# Patient Record
Sex: MALE | Race: BLACK OR AFRICAN AMERICAN | NOT HISPANIC OR LATINO | URBAN - METROPOLITAN AREA
[De-identification: names, ages, dates, MRNs, and addresses within clinical notes are randomized per-mention and may not be internally consistent; named-entity substitution may affect disease eponyms.]

---

## 2022-04-27 ENCOUNTER — INPATIENT (INPATIENT)
Facility: HOSPITAL | Age: 72
LOS: 5 days | Discharge: HOME | DRG: 315 | End: 2022-05-03
Attending: THORACIC SURGERY (CARDIOTHORACIC VASCULAR SURGERY) | Admitting: THORACIC SURGERY (CARDIOTHORACIC VASCULAR SURGERY)
Payer: MEDICARE

## 2022-04-27 VITALS
SYSTOLIC BLOOD PRESSURE: 163 MMHG | HEIGHT: 62 IN | WEIGHT: 157.63 LBS | DIASTOLIC BLOOD PRESSURE: 90 MMHG | RESPIRATION RATE: 18 BRPM | OXYGEN SATURATION: 97 % | HEART RATE: 72 BPM

## 2022-04-27 DIAGNOSIS — Z95.828 PRESENCE OF OTHER VASCULAR IMPLANTS AND GRAFTS: Chronic | ICD-10-CM

## 2022-04-27 LAB
A1C WITH ESTIMATED AVERAGE GLUCOSE RESULT: 5.6 % — SIGNIFICANT CHANGE UP (ref 4–5.6)
ALBUMIN SERPL ELPH-MCNC: 3.7 G/DL — SIGNIFICANT CHANGE UP (ref 3.3–5)
ALP SERPL-CCNC: 101 U/L — SIGNIFICANT CHANGE UP (ref 40–120)
ALT FLD-CCNC: 12 U/L — SIGNIFICANT CHANGE UP (ref 10–45)
ANION GAP SERPL CALC-SCNC: 11 MMOL/L — SIGNIFICANT CHANGE UP (ref 5–17)
APPEARANCE UR: CLEAR — SIGNIFICANT CHANGE UP
APTT BLD: 34 SEC — SIGNIFICANT CHANGE UP (ref 27.5–35.5)
AST SERPL-CCNC: 13 U/L — SIGNIFICANT CHANGE UP (ref 10–40)
BACTERIA # UR AUTO: ABNORMAL /HPF
BASOPHILS # BLD AUTO: 0.02 K/UL — SIGNIFICANT CHANGE UP (ref 0–0.2)
BASOPHILS NFR BLD AUTO: 0.3 % — SIGNIFICANT CHANGE UP (ref 0–2)
BILIRUB SERPL-MCNC: 0.7 MG/DL — SIGNIFICANT CHANGE UP (ref 0.2–1.2)
BILIRUB UR-MCNC: NEGATIVE — SIGNIFICANT CHANGE UP
BLD GP AB SCN SERPL QL: NEGATIVE — SIGNIFICANT CHANGE UP
BUN SERPL-MCNC: 28 MG/DL — HIGH (ref 7–23)
CALCIUM SERPL-MCNC: 9.6 MG/DL — SIGNIFICANT CHANGE UP (ref 8.4–10.5)
CHLORIDE SERPL-SCNC: 106 MMOL/L — SIGNIFICANT CHANGE UP (ref 96–108)
CHOLEST SERPL-MCNC: 162 MG/DL — SIGNIFICANT CHANGE UP
CO2 SERPL-SCNC: 26 MMOL/L — SIGNIFICANT CHANGE UP (ref 22–31)
COLOR SPEC: YELLOW — SIGNIFICANT CHANGE UP
CREAT SERPL-MCNC: 1.25 MG/DL — SIGNIFICANT CHANGE UP (ref 0.5–1.3)
DIFF PNL FLD: ABNORMAL
EGFR: 62 ML/MIN/1.73M2 — SIGNIFICANT CHANGE UP
EOSINOPHIL # BLD AUTO: 0.38 K/UL — SIGNIFICANT CHANGE UP (ref 0–0.5)
EOSINOPHIL NFR BLD AUTO: 6.2 % — HIGH (ref 0–6)
EPI CELLS # UR: ABNORMAL /HPF (ref 0–5)
ESTIMATED AVERAGE GLUCOSE: 114 MG/DL — SIGNIFICANT CHANGE UP (ref 68–114)
GLUCOSE SERPL-MCNC: 89 MG/DL — SIGNIFICANT CHANGE UP (ref 70–99)
GLUCOSE UR QL: NEGATIVE — SIGNIFICANT CHANGE UP
HCT VFR BLD CALC: 33.4 % — LOW (ref 39–50)
HDLC SERPL-MCNC: 65 MG/DL — SIGNIFICANT CHANGE UP
HGB BLD-MCNC: 11.5 G/DL — LOW (ref 13–17)
IMM GRANULOCYTES NFR BLD AUTO: 0.3 % — SIGNIFICANT CHANGE UP (ref 0–1.5)
INR BLD: 1.18 — HIGH (ref 0.88–1.16)
KETONES UR-MCNC: NEGATIVE — SIGNIFICANT CHANGE UP
LEUKOCYTE ESTERASE UR-ACNC: ABNORMAL
LIPID PNL WITH DIRECT LDL SERPL: 74 MG/DL — SIGNIFICANT CHANGE UP
LYMPHOCYTES # BLD AUTO: 1.02 K/UL — SIGNIFICANT CHANGE UP (ref 1–3.3)
LYMPHOCYTES # BLD AUTO: 16.7 % — SIGNIFICANT CHANGE UP (ref 13–44)
MAGNESIUM SERPL-MCNC: 1.8 MG/DL — SIGNIFICANT CHANGE UP (ref 1.6–2.6)
MCHC RBC-ENTMCNC: 26.3 PG — LOW (ref 27–34)
MCHC RBC-ENTMCNC: 34.4 GM/DL — SIGNIFICANT CHANGE UP (ref 32–36)
MCV RBC AUTO: 76.3 FL — LOW (ref 80–100)
MONOCYTES # BLD AUTO: 0.7 K/UL — SIGNIFICANT CHANGE UP (ref 0–0.9)
MONOCYTES NFR BLD AUTO: 11.5 % — SIGNIFICANT CHANGE UP (ref 2–14)
NEUTROPHILS # BLD AUTO: 3.96 K/UL — SIGNIFICANT CHANGE UP (ref 1.8–7.4)
NEUTROPHILS NFR BLD AUTO: 65 % — SIGNIFICANT CHANGE UP (ref 43–77)
NITRITE UR-MCNC: POSITIVE
NON HDL CHOLESTEROL: 97 MG/DL — SIGNIFICANT CHANGE UP
NRBC # BLD: 0 /100 WBCS — SIGNIFICANT CHANGE UP (ref 0–0)
NT-PROBNP SERPL-SCNC: 170 PG/ML — SIGNIFICANT CHANGE UP (ref 0–300)
PH UR: 6 — SIGNIFICANT CHANGE UP (ref 5–8)
PLATELET # BLD AUTO: 130 K/UL — LOW (ref 150–400)
POTASSIUM SERPL-MCNC: 4.3 MMOL/L — SIGNIFICANT CHANGE UP (ref 3.5–5.3)
POTASSIUM SERPL-SCNC: 4.3 MMOL/L — SIGNIFICANT CHANGE UP (ref 3.5–5.3)
PROT SERPL-MCNC: 6.9 G/DL — SIGNIFICANT CHANGE UP (ref 6–8.3)
PROT UR-MCNC: 100 MG/DL
PROTHROM AB SERPL-ACNC: 14.1 SEC — HIGH (ref 10.5–13.4)
RBC # BLD: 4.38 M/UL — SIGNIFICANT CHANGE UP (ref 4.2–5.8)
RBC # FLD: 15.3 % — HIGH (ref 10.3–14.5)
RBC CASTS # UR COMP ASSIST: ABNORMAL /HPF
RH IG SCN BLD-IMP: POSITIVE — SIGNIFICANT CHANGE UP
RH IG SCN BLD-IMP: POSITIVE — SIGNIFICANT CHANGE UP
SARS-COV-2 RNA SPEC QL NAA+PROBE: SIGNIFICANT CHANGE UP
SODIUM SERPL-SCNC: 143 MMOL/L — SIGNIFICANT CHANGE UP (ref 135–145)
SP GR SPEC: 1.02 — SIGNIFICANT CHANGE UP (ref 1–1.03)
T4 AB SER-ACNC: 6.23 UG/DL — SIGNIFICANT CHANGE UP (ref 4.5–11.7)
TRIGL SERPL-MCNC: 116 MG/DL — SIGNIFICANT CHANGE UP
TROPONIN T SERPL-MCNC: 0.01 NG/ML — SIGNIFICANT CHANGE UP (ref 0–0.01)
TSH SERPL-MCNC: 2.04 UIU/ML — SIGNIFICANT CHANGE UP (ref 0.27–4.2)
UROBILINOGEN FLD QL: 0.2 E.U./DL — SIGNIFICANT CHANGE UP
WBC # BLD: 6.1 K/UL — SIGNIFICANT CHANGE UP (ref 3.8–10.5)
WBC # FLD AUTO: 6.1 K/UL — SIGNIFICANT CHANGE UP (ref 3.8–10.5)
WBC UR QL: ABNORMAL /HPF

## 2022-04-27 PROCEDURE — 71045 X-RAY EXAM CHEST 1 VIEW: CPT | Mod: 26

## 2022-04-27 PROCEDURE — 71275 CT ANGIOGRAPHY CHEST: CPT | Mod: 26

## 2022-04-27 PROCEDURE — 74174 CTA ABD&PLVS W/CONTRAST: CPT | Mod: 26

## 2022-04-27 RX ORDER — LABETALOL HCL 100 MG
200 TABLET ORAL EVERY 8 HOURS
Refills: 0 | Status: DISCONTINUED | OUTPATIENT
Start: 2022-04-27 | End: 2022-05-01

## 2022-04-27 RX ORDER — SODIUM CHLORIDE 9 MG/ML
3 INJECTION INTRAMUSCULAR; INTRAVENOUS; SUBCUTANEOUS EVERY 8 HOURS
Refills: 0 | Status: DISCONTINUED | OUTPATIENT
Start: 2022-04-27 | End: 2022-05-03

## 2022-04-27 RX ORDER — ALBUTEROL 90 UG/1
2 AEROSOL, METERED ORAL
Refills: 0 | Status: DISCONTINUED | OUTPATIENT
Start: 2022-04-27 | End: 2022-04-30

## 2022-04-27 RX ORDER — IPRATROPIUM/ALBUTEROL SULFATE 18-103MCG
3 AEROSOL WITH ADAPTER (GRAM) INHALATION EVERY 6 HOURS
Refills: 0 | Status: DISCONTINUED | OUTPATIENT
Start: 2022-04-27 | End: 2022-04-30

## 2022-04-27 RX ORDER — SIMVASTATIN 20 MG/1
40 TABLET, FILM COATED ORAL AT BEDTIME
Refills: 0 | Status: DISCONTINUED | OUTPATIENT
Start: 2022-04-27 | End: 2022-05-03

## 2022-04-27 RX ORDER — HEPARIN SODIUM 5000 [USP'U]/ML
5000 INJECTION INTRAVENOUS; SUBCUTANEOUS EVERY 8 HOURS
Refills: 0 | Status: DISCONTINUED | OUTPATIENT
Start: 2022-04-27 | End: 2022-05-03

## 2022-04-27 RX ORDER — AMLODIPINE BESYLATE 2.5 MG/1
10 TABLET ORAL DAILY
Refills: 0 | Status: DISCONTINUED | OUTPATIENT
Start: 2022-04-27 | End: 2022-05-01

## 2022-04-27 RX ORDER — FAMOTIDINE 10 MG/ML
20 INJECTION INTRAVENOUS
Refills: 0 | Status: DISCONTINUED | OUTPATIENT
Start: 2022-04-27 | End: 2022-05-03

## 2022-04-27 RX ADMIN — SODIUM CHLORIDE 3 MILLILITER(S): 9 INJECTION INTRAMUSCULAR; INTRAVENOUS; SUBCUTANEOUS at 21:05

## 2022-04-27 RX ADMIN — Medication 200 MILLIGRAM(S): at 06:20

## 2022-04-27 RX ADMIN — SODIUM CHLORIDE 3 MILLILITER(S): 9 INJECTION INTRAMUSCULAR; INTRAVENOUS; SUBCUTANEOUS at 13:26

## 2022-04-27 RX ADMIN — AMLODIPINE BESYLATE 10 MILLIGRAM(S): 2.5 TABLET ORAL at 06:19

## 2022-04-27 RX ADMIN — FAMOTIDINE 20 MILLIGRAM(S): 10 INJECTION INTRAVENOUS at 17:46

## 2022-04-27 RX ADMIN — Medication 200 MILLIGRAM(S): at 13:41

## 2022-04-27 RX ADMIN — Medication 1 TABLET(S): at 17:46

## 2022-04-27 RX ADMIN — SIMVASTATIN 40 MILLIGRAM(S): 20 TABLET, FILM COATED ORAL at 21:11

## 2022-04-27 RX ADMIN — HEPARIN SODIUM 5000 UNIT(S): 5000 INJECTION INTRAVENOUS; SUBCUTANEOUS at 21:11

## 2022-04-27 RX ADMIN — Medication 200 MILLIGRAM(S): at 21:11

## 2022-04-27 RX ADMIN — HEPARIN SODIUM 5000 UNIT(S): 5000 INJECTION INTRAVENOUS; SUBCUTANEOUS at 13:41

## 2022-04-27 RX ADMIN — Medication 1 TABLET(S): at 06:19

## 2022-04-27 RX ADMIN — Medication 3 MILLILITER(S): at 18:03

## 2022-04-27 NOTE — H&P ADULT - ASSESSMENT
71 y/o male poor historian lies in a nursing home PMH type B aortic dissection 2015, s/p TEVAR , HTN, asthma, BPH who presented to [facility] with an epigastric hematoma. Denies pain or traumatic fall, not on anticoagulants. No other significant symptoms. Subsequent CTA chest showed minimal extravasation of contrast/endovascular leak, normal post-operative changes suggestive of reimplantation of great vessels            plan: observation/BP control/no surgical intervention most likely normal post op changes seen on CTA chest/small endoleak

## 2022-04-27 NOTE — H&P ADULT - NSHPREVIEWOFSYSTEMS_GEN_ALL_CORE
Review of Systems  CONSTITUTIONAL:  Denies Fevers / chills, sweats, fatigue, weight loss, weight gain                                      NEURO:  Denies paresthesias, seizures, syncope, confusion                                                                                EYES:  Denies Blurry vision, discharge, pain, loss of vision                                                                                    ENMT:  Denies Difficulty hearing, vertigo, dysphagia, epistaxis, recent dental work                                       CV:  Denies Chest pain, palpitations, BUENROSTRO, orthopnea                                                                                          RESPIRATORY:    Wheezing,                                                                 GI:  Denies Nausea, vomiting, diarrhea, constipation, melena, difficulty swallowing                                               : Denies Hematuria, dysuria, urgency, incontinence                                                                                         MUSCULOSKELETAL:  Denies arthritis, joint swelling, muscle weakness                                                             SKIN/BREAST:  Denies rash, itching, hair loss, masses                                                                                            PSYCH:  Denies depression, anxiety, suicidal ideation                                                                                               HEME/LYMPH:  Denies bruises easily, enlarged lymph nodes, tender lymph nodes                                        ENDOCRINE:  Denies cold intolerance, heat intolerance, polydipsia

## 2022-04-27 NOTE — H&P ADULT - HISTORY OF PRESENT ILLNESS
71 y/o male poor historian lies in a nursing home PMH type B aortic dissection 2015, s/p TEVAR , HTN, asthma, BPH who presented to [facility] with an epigastric hematoma. Denies pain or traumatic fall, not on anticoagulants. No other significant symptoms. Subsequent CTA chest showed minimal extravasation of contrast/endovascular leak, normal post-operative changes suggestive of reimplantation of great vessels. Transferred to [facility] under Dr. Marie for evaluation and management.

## 2022-04-27 NOTE — H&P ADULT - NSCORESITESY/N_GEN_A_CORE_RD
Problem: Respiratory Impairment - Respiratory Therapy 253  Intervention: Inhaled medication delivery  Intervention Status  Done         No

## 2022-04-27 NOTE — PATIENT PROFILE ADULT - FALL HARM RISK - RISK INTERVENTIONS
Leon Lazaro
Assistance OOB with selected safe patient handling equipment/Assistance with ambulation/Communicate Fall Risk and Risk Factors to all staff, patient, and family/Reinforce activity limits and safety measures with patient and family/Visual Cue: Yellow wristband/Bed in lowest position, wheels locked, appropriate side rails in place/Call bell, personal items and telephone in reach/Instruct patient to call for assistance before getting out of bed or chair/Non-slip footwear when patient is out of bed/[place] to call system/Physically safe environment - no spills, clutter or unnecessary equipment/Purposeful Proactive Rounding/Room/bathroom lighting operational, light cord in reach

## 2022-04-27 NOTE — PROGRESS NOTE ADULT - SUBJECTIVE AND OBJECTIVE BOX
Patient discussed on morning rounds with Dr. Marie    Operation / Date:  Type B aortic dissection repair with Dr. Marie    SUBJECTIVE ASSESSMENT:  Patient feels well today.  He denies any recent fall, car accident, trauma, fights, etc.  He recently developed left lower chest/upper abdomen ecchymosis and small round "area of hardening" in the area under the skin.  Nothing preceded this.  He went to an OSH and CTA done revealing ?small endoleak along with post-surgical changes but nothing significant to explain this bruising.  No broken ribs or other signs of trauma.        Vital Signs Last 24 Hrs  T(C): 36.6 (2022 09:15), Max: 36.6 (2022 09:15)  T(F): 97.8 (2022 09:15), Max: 97.8 (2022 09:15)  HR: 74 (2022 08:14) (70 - 74)  BP: 134/75 (2022 08:14) (134/62 - 163/90)  BP(mean): 100 (2022 08:14) (92 - 120)  RR: 16 (2022 05:30) (16 - 18)  SpO2: 96% (2022 08:14) (96% - 98%)  I&O's Detail    2022 07:01  -  2022 07:00  --------------------------------------------------------  IN:  Total IN: 0 mL    OUT:    Voided (mL): 500 mL  Total OUT: 500 mL    Total NET: -500 mL      2022 07:01  -  2022 11:03  --------------------------------------------------------  IN:    Oral Fluid: 275 mL  Total IN: 275 mL    OUT:  Total OUT: 0 mL    Total NET: 275 mL          PHYSICAL EXAM:    GEN: NAD, looks comfortable; Non-toxic appearing.   Psych: Mood appropriate  Neuro: A&Ox3.  No focal deficits.  Moving all extremities.   HEENT: No obvious abnormalities  CV: S1S2, regular, no murmurs appreciated.  No carotid bruits.  No JVD  Lungs: Clear B/L.  No wheezing, rales or rhonchi  ABD: Soft, non-tender, non-distended.  +Bowel sounds  EXT: Warm and well perfused.  No peripheral edema noted  Musculoskeletal: Moving all extremities with normal ROM, no joint swelling  PV: Pedal pulses palpable  Skin: Area under left rib cage, anteriorly on chest with horizontal oval shaped ecchymosis about [id number], along with a round/walnut size hard ?hematoma felt under the skin.  It is not moveable.  Painful to touch.  Not warm to touch.      LABS:                        11.5   6.10  )-----------( 130      ( 2022 02:20 )             33.4       COUMADIN:  Yes/No. REASON: .    PT/INR - ( 2022 02:20 )   PT: 14.1 sec;   INR: 1.18          PTT - ( 2022 02:20 )  PTT:34.0 sec        143  |  106  |  28<H>  ----------------------------<  89  4.3   |  26  |  1.25    Ca    9.6      2022 02:20  Mg     1.8         TPro  6.9  /  Alb  3.7  /  TBili  0.7  /  DBili  x   /  AST  13  /  ALT  12  /  AlkPhos  101        Urinalysis Basic - ( 2022 02:20 )    Color: Yellow / Appearance: Clear / S.020 / pH: x  Gluc: x / Ketone: NEGATIVE  / Bili: Negative / Urobili: 0.2 E.U./dL   Blood: x / Protein: 100 mg/dL / Nitrite: POSITIVE   Leuk Esterase: Trace / RBC: Many /HPF / WBC Many /HPF   Sq Epi: x / Non Sq Epi: 5-10 /HPF / Bacteria: Many /HPF        MEDICATIONS  (STANDING):  amLODIPine   Tablet 10 milliGRAM(s) Oral daily  famotidine    Tablet 20 milliGRAM(s) Oral two times a day  heparin   Injectable 5000 Unit(s) SubCutaneous every 8 hours  labetalol 200 milliGRAM(s) Oral every 8 hours  simvastatin 40 milliGRAM(s) Oral at bedtime  sodium chloride 0.9% lock flush 3 milliLiter(s) IV Push every 8 hours  trimethoprim  160 mG/sulfamethoxazole 800 mG 1 Tablet(s) Oral two times a day    MEDICATIONS  (PRN):        RADIOLOGY & ADDITIONAL TESTS:

## 2022-04-27 NOTE — PROGRESS NOTE ADULT - ASSESSMENT
71 y/o male poor historian who lives in a nursing home with PMHx type B aortic dissection 2015, s/p TEVAR , HTN, asthma, BPH who presented to [facility] with new onset bruising left upper epigastric area, just under rib cage associated w/ pain to touch.  Came on about 5 days ago.  Denies traumatic fall or other accident/injuries. Not on anticoagulants, just ASA.  No other significant symptoms.  Subsequent CTA chest showed minimal extravasation of contrast/endovascular leak, normal post-operative changes suggestive of reimplantation of great vessels.      Problem 1.  ?New hematoma under skin.  ?Related to previous aortic surgery or not.  Dr. Marie reviewed paper report from OSH.  Non-toxic appearing.  WBC normal, afebrile.  Plan today per Dr. Marie- Repeat CT scan, 3-phase C/A/P.      Problem 2.  HTN.  Labetolol and Norvasc.      Problem 3.  UTI, +UA.  UCx with next void.      Problem 4.  HLD. Statin        Tele  Full CODE

## 2022-04-27 NOTE — H&P ADULT - NSICDXPASTMEDICALHX_GEN_ALL_CORE_FT
PAST MEDICAL HISTORY:  BPH (benign prostatic hyperplasia)     H/O aortic dissection     HTN (hypertension)

## 2022-04-27 NOTE — H&P ADULT - NSHPPHYSICALEXAM_GEN_ALL_CORE
Physical Exam  CONSTITUTIONAL:                                                              poor historian/cognitive dysfunction  NEURO:                                                                       WNL                      EYES:                                                                                WNL  ENMT:                                                                               WNL  CV:                                                                                   NSR  RESPIRATORY:                                                                 mild expiratory wheezes  GI:                                                                                     WNL  : GUAJARDO + / -                                                                  WNL  MUSCULOSKELETAL:                                                       WNL  SKIN / BREAST:                                                                  small hematoma left side of stomach 10cm x 10cm      ICU Vital Signs Last 24 Hrs  T(C): 36.3 (27 Apr 2022 01:00), Max: 36.3 (27 Apr 2022 01:00)  T(F): 97.4 (27 Apr 2022 01:00), Max: 97.4 (27 Apr 2022 01:00)  HR: 70 (27 Apr 2022 01:16) (70 - 72)  BP: 156/78 (27 Apr 2022 01:16) (156/78 - 163/90)  BP(mean): 108 (27 Apr 2022 01:16) (108 - 120)  ABP: --  ABP(mean): --  RR: 16 (27 Apr 2022 01:16) (16 - 18)  SpO2: 97% (27 Apr 2022 01:16) (97% - 97%)

## 2022-04-28 LAB
ANION GAP SERPL CALC-SCNC: 10 MMOL/L — SIGNIFICANT CHANGE UP (ref 5–17)
ANION GAP SERPL CALC-SCNC: 12 MMOL/L — SIGNIFICANT CHANGE UP (ref 5–17)
ANION GAP SERPL CALC-SCNC: 13 MMOL/L — SIGNIFICANT CHANGE UP (ref 5–17)
BUN SERPL-MCNC: 33 MG/DL — HIGH (ref 7–23)
BUN SERPL-MCNC: 34 MG/DL — HIGH (ref 7–23)
BUN SERPL-MCNC: 37 MG/DL — HIGH (ref 7–23)
CALCIUM SERPL-MCNC: 8.8 MG/DL — SIGNIFICANT CHANGE UP (ref 8.4–10.5)
CALCIUM SERPL-MCNC: 8.8 MG/DL — SIGNIFICANT CHANGE UP (ref 8.4–10.5)
CALCIUM SERPL-MCNC: 9.2 MG/DL — SIGNIFICANT CHANGE UP (ref 8.4–10.5)
CHLORIDE SERPL-SCNC: 102 MMOL/L — SIGNIFICANT CHANGE UP (ref 96–108)
CHLORIDE SERPL-SCNC: 102 MMOL/L — SIGNIFICANT CHANGE UP (ref 96–108)
CHLORIDE SERPL-SCNC: 103 MMOL/L — SIGNIFICANT CHANGE UP (ref 96–108)
CO2 SERPL-SCNC: 24 MMOL/L — SIGNIFICANT CHANGE UP (ref 22–31)
CO2 SERPL-SCNC: 25 MMOL/L — SIGNIFICANT CHANGE UP (ref 22–31)
CO2 SERPL-SCNC: 26 MMOL/L — SIGNIFICANT CHANGE UP (ref 22–31)
CREAT SERPL-MCNC: 1.97 MG/DL — HIGH (ref 0.5–1.3)
CREAT SERPL-MCNC: 2.2 MG/DL — HIGH (ref 0.5–1.3)
CREAT SERPL-MCNC: 2.47 MG/DL — HIGH (ref 0.5–1.3)
EGFR: 27 ML/MIN/1.73M2 — LOW
EGFR: 31 ML/MIN/1.73M2 — LOW
EGFR: 36 ML/MIN/1.73M2 — LOW
GLUCOSE SERPL-MCNC: 125 MG/DL — HIGH (ref 70–99)
GLUCOSE SERPL-MCNC: 133 MG/DL — HIGH (ref 70–99)
GLUCOSE SERPL-MCNC: 95 MG/DL — SIGNIFICANT CHANGE UP (ref 70–99)
HCT VFR BLD CALC: 32.9 % — LOW (ref 39–50)
HCV AB S/CO SERPL IA: 0.04 S/CO — SIGNIFICANT CHANGE UP
HCV AB SERPL-IMP: SIGNIFICANT CHANGE UP
HGB BLD-MCNC: 11.1 G/DL — LOW (ref 13–17)
MAGNESIUM SERPL-MCNC: 2 MG/DL — SIGNIFICANT CHANGE UP (ref 1.6–2.6)
MCHC RBC-ENTMCNC: 26.3 PG — LOW (ref 27–34)
MCHC RBC-ENTMCNC: 33.7 GM/DL — SIGNIFICANT CHANGE UP (ref 32–36)
MCV RBC AUTO: 78 FL — LOW (ref 80–100)
NRBC # BLD: 0 /100 WBCS — SIGNIFICANT CHANGE UP (ref 0–0)
PLATELET # BLD AUTO: 134 K/UL — LOW (ref 150–400)
POTASSIUM SERPL-MCNC: 4.3 MMOL/L — SIGNIFICANT CHANGE UP (ref 3.5–5.3)
POTASSIUM SERPL-MCNC: 4.7 MMOL/L — SIGNIFICANT CHANGE UP (ref 3.5–5.3)
POTASSIUM SERPL-MCNC: 4.8 MMOL/L — SIGNIFICANT CHANGE UP (ref 3.5–5.3)
POTASSIUM SERPL-SCNC: 4.3 MMOL/L — SIGNIFICANT CHANGE UP (ref 3.5–5.3)
POTASSIUM SERPL-SCNC: 4.7 MMOL/L — SIGNIFICANT CHANGE UP (ref 3.5–5.3)
POTASSIUM SERPL-SCNC: 4.8 MMOL/L — SIGNIFICANT CHANGE UP (ref 3.5–5.3)
RBC # BLD: 4.22 M/UL — SIGNIFICANT CHANGE UP (ref 4.2–5.8)
RBC # FLD: 15.4 % — HIGH (ref 10.3–14.5)
SODIUM SERPL-SCNC: 139 MMOL/L — SIGNIFICANT CHANGE UP (ref 135–145)
WBC # BLD: 4.94 K/UL — SIGNIFICANT CHANGE UP (ref 3.8–10.5)
WBC # FLD AUTO: 4.94 K/UL — SIGNIFICANT CHANGE UP (ref 3.8–10.5)

## 2022-04-28 PROCEDURE — 99231 SBSQ HOSP IP/OBS SF/LOW 25: CPT

## 2022-04-28 RX ORDER — SODIUM CHLORIDE 9 MG/ML
1000 INJECTION INTRAMUSCULAR; INTRAVENOUS; SUBCUTANEOUS ONCE
Refills: 0 | Status: COMPLETED | OUTPATIENT
Start: 2022-04-28 | End: 2022-04-28

## 2022-04-28 RX ORDER — CIPROFLOXACIN LACTATE 400MG/40ML
400 VIAL (ML) INTRAVENOUS EVERY 12 HOURS
Refills: 0 | Status: DISCONTINUED | OUTPATIENT
Start: 2022-04-28 | End: 2022-04-28

## 2022-04-28 RX ORDER — SODIUM CHLORIDE 9 MG/ML
1000 INJECTION INTRAMUSCULAR; INTRAVENOUS; SUBCUTANEOUS
Refills: 0 | Status: DISCONTINUED | OUTPATIENT
Start: 2022-04-28 | End: 2022-05-01

## 2022-04-28 RX ORDER — CIPROFLOXACIN LACTATE 400MG/40ML
400 VIAL (ML) INTRAVENOUS ONCE
Refills: 0 | Status: COMPLETED | OUTPATIENT
Start: 2022-04-28 | End: 2022-04-28

## 2022-04-28 RX ORDER — CEFTRIAXONE 500 MG/1
1000 INJECTION, POWDER, FOR SOLUTION INTRAMUSCULAR; INTRAVENOUS EVERY 24 HOURS
Refills: 0 | Status: DISCONTINUED | OUTPATIENT
Start: 2022-04-29 | End: 2022-04-30

## 2022-04-28 RX ORDER — SODIUM CHLORIDE 9 MG/ML
1000 INJECTION INTRAMUSCULAR; INTRAVENOUS; SUBCUTANEOUS ONCE
Refills: 0 | Status: DISCONTINUED | OUTPATIENT
Start: 2022-04-28 | End: 2022-04-28

## 2022-04-28 RX ORDER — CIPROFLOXACIN LACTATE 400MG/40ML
VIAL (ML) INTRAVENOUS
Refills: 0 | Status: DISCONTINUED | OUTPATIENT
Start: 2022-04-28 | End: 2022-04-28

## 2022-04-28 RX ADMIN — SIMVASTATIN 40 MILLIGRAM(S): 20 TABLET, FILM COATED ORAL at 21:19

## 2022-04-28 RX ADMIN — HEPARIN SODIUM 5000 UNIT(S): 5000 INJECTION INTRAVENOUS; SUBCUTANEOUS at 06:14

## 2022-04-28 RX ADMIN — Medication 200 MILLIGRAM(S): at 06:15

## 2022-04-28 RX ADMIN — FAMOTIDINE 20 MILLIGRAM(S): 10 INJECTION INTRAVENOUS at 06:14

## 2022-04-28 RX ADMIN — SODIUM CHLORIDE 3 MILLILITER(S): 9 INJECTION INTRAMUSCULAR; INTRAVENOUS; SUBCUTANEOUS at 06:14

## 2022-04-28 RX ADMIN — SODIUM CHLORIDE 3 MILLILITER(S): 9 INJECTION INTRAMUSCULAR; INTRAVENOUS; SUBCUTANEOUS at 13:01

## 2022-04-28 RX ADMIN — Medication 200 MILLIGRAM(S): at 21:19

## 2022-04-28 RX ADMIN — SODIUM CHLORIDE 3 MILLILITER(S): 9 INJECTION INTRAMUSCULAR; INTRAVENOUS; SUBCUTANEOUS at 21:15

## 2022-04-28 RX ADMIN — Medication 200 MILLIGRAM(S): at 11:22

## 2022-04-28 RX ADMIN — AMLODIPINE BESYLATE 10 MILLIGRAM(S): 2.5 TABLET ORAL at 06:14

## 2022-04-28 RX ADMIN — Medication 200 MILLIGRAM(S): at 13:19

## 2022-04-28 RX ADMIN — HEPARIN SODIUM 5000 UNIT(S): 5000 INJECTION INTRAVENOUS; SUBCUTANEOUS at 21:20

## 2022-04-28 RX ADMIN — Medication 3 MILLILITER(S): at 20:41

## 2022-04-28 RX ADMIN — SODIUM CHLORIDE 50 MILLILITER(S): 9 INJECTION INTRAMUSCULAR; INTRAVENOUS; SUBCUTANEOUS at 13:16

## 2022-04-28 RX ADMIN — SODIUM CHLORIDE 1000 MILLILITER(S): 9 INJECTION INTRAMUSCULAR; INTRAVENOUS; SUBCUTANEOUS at 09:00

## 2022-04-28 RX ADMIN — Medication 1 TABLET(S): at 06:16

## 2022-04-28 RX ADMIN — HEPARIN SODIUM 5000 UNIT(S): 5000 INJECTION INTRAVENOUS; SUBCUTANEOUS at 13:16

## 2022-04-28 RX ADMIN — FAMOTIDINE 20 MILLIGRAM(S): 10 INJECTION INTRAVENOUS at 16:49

## 2022-04-28 NOTE — PROGRESS NOTE ADULT - SUBJECTIVE AND OBJECTIVE BOX
Patient discussed on morning rounds with Dr. Marie    Operation / Date:  Type B aortic dissection repair with Dr. Marie      SUBJECTIVE ASSESSMENT:  72y Male seen and examined at bedside.  Patient with no complaints.  Patient denies chest pain, shortness of breath, nausea, vomiting.        Vital Signs Last 24 Hrs  T(C): 36.6 (2022 06:10), Max: 36.9 (2022 21:33)  T(F): 97.8 (2022 06:10), Max: 98.5 (2022 21:33)  HR: 72 (2022 09:37) (71 - 83)  BP: 116/56 (2022 09:37) (116/56 - 150/71)  BP(mean): 80 (2022 09:37) (80 - 101)  RR: 17 (2022 09:37) (12 - 18)  SpO2: 95% (2022 09:37) (94% - 99%)  I&O's Detail    2022 07:01  -  2022 07:00  --------------------------------------------------------  IN:    Oral Fluid: 750 mL  Total IN: 750 mL    OUT:    Voided (mL): 800 mL  Total OUT: 800 mL    Total NET: -50 mL          CHEST TUBE:  No.   MATTHEW DRAIN:  No.  EPICARDIAL WIRES: No.  TIE DOWNS: No.  GUAJARDO: No.    PHYSICAL EXAM:    GEN: NAD, looks comfortable; Non-toxic appearing.   Psych: Mood appropriate  Neuro: A&Ox3.  No focal deficits.  Moving all extremities.   HEENT: No obvious abnormalities  CV: S1S2, regular, no murmurs appreciated.  No carotid bruits.  No JVD  Lungs: Clear B/L.  No wheezing, rales or rhonchi  ABD: Soft, non-tender, non-distended.  +Bowel sounds  EXT: Warm and well perfused.  No peripheral edema noted  Musculoskeletal: Moving all extremities with normal ROM, no joint swelling  PV: Pedal pulses palpable  Skin: Area under left rib cage, anteriorly on chest with horizontal oval shaped ecchymosis about [id number], along with a round/walnut size hard ?hematoma felt under the skin.  It is not moveable.  Painful to touch.  Not warm to touch. Appears to be stable    LABS:                        11.1   4.94  )-----------( 134      ( 2022 05:49 )             32.9       COUMADIN:  Yes/No. REASON: .    PT/INR - ( 2022 02:20 )   PT: 14.1 sec;   INR: 1.18          PTT - ( 2022 02:20 )  PTT:34.0 sec        139  |  102  |  34<H>  ----------------------------<  95  4.3   |  25  |  1.97<H>    Ca    9.2      2022 05:49  Mg     2.0         TPro  6.9  /  Alb  3.7  /  TBili  0.7  /  DBili  x   /  AST  13  /  ALT  12  /  AlkPhos  101        Urinalysis Basic - ( 2022 02:20 )    Color: Yellow / Appearance: Clear / S.020 / pH: x  Gluc: x / Ketone: NEGATIVE  / Bili: Negative / Urobili: 0.2 E.U./dL   Blood: x / Protein: 100 mg/dL / Nitrite: POSITIVE   Leuk Esterase: Trace / RBC: Many /HPF / WBC Many /HPF   Sq Epi: x / Non Sq Epi: 5-10 /HPF / Bacteria: Many /HPF        MEDICATIONS  (STANDING):  amLODIPine   Tablet 10 milliGRAM(s) Oral daily  famotidine    Tablet 20 milliGRAM(s) Oral two times a day  heparin   Injectable 5000 Unit(s) SubCutaneous every 8 hours  labetalol 200 milliGRAM(s) Oral every 8 hours  simvastatin 40 milliGRAM(s) Oral at bedtime  sodium chloride 0.9% lock flush 3 milliLiter(s) IV Push every 8 hours    MEDICATIONS  (PRN):  ALBUTerol    90 MICROgram(s) HFA Inhaler 2 Puff(s) Inhalation two times a day PRN Shortness of Breath and/or Wheezing  albuterol/ipratropium for Nebulization 3 milliLiter(s) Nebulizer every 6 hours PRN Shortness of Breath and/or Wheezing        RADIOLOGY & ADDITIONAL TESTS:

## 2022-04-28 NOTE — PROGRESS NOTE ADULT - ASSESSMENT
71 y/o male poor historian who lives in a nursing home with PMHx type B aortic dissection 2015, s/p TEVAR , HTN, asthma, BPH who presented to [facility] with new onset bruising left upper epigastric area, just under rib cage associated w/ pain to touch.  Came on about 5 days ago.  Denies traumatic fall or other accident/injuries. Not on anticoagulants, just ASA.  No other significant symptoms.  Subsequent CTA chest showed minimal extravasation of contrast/endovascular leak, normal post-operative changes suggestive of reimplantation of great vessels.      ==== Neurovascular ====  -No delirium, pain well managed on current regimen  -C/w PRNs for Pain control  -Monitor neuro status    ==== Respiratory ====  -Saturates well on RA     -AM CXR stable, repeat in AM  -Encourage IS 10x/hour while awake, Cough and deep breathing exercises  -Monitor respiratory status via SpO2    ==== Cardiovascular ====  Monitor on Tele  Continue aspirin 81mg QD  Continue Statin  Continue Labetalol 200mg QD  Continue amlodipine 10mg QD    ==== GI ====   -Tolerating PO  -Prophylaxis: Protonix  -C/w bowel regimen    ==== /Renal ====  -BUN/Cr: 34/1.97, hydrate with IVF, stop bactrim, repeat at 12pm  -Trend Cr on AM labs  -Replete electrolytes as needed      ==== ID ====   Afebrile, asymptomatic  -WBC: 4.94  -Continue to monitor for SIRS/Sepsis syndrome while inpatient  -UA positive, started on bactrim, got YANIRA, bactrim stopped, will start ciprol 500mg Q8hrs for 5 days    ==== Endocrine ====   -A1C: no h/o DM  -TSH: no h/o thyroid disease     ==== Hematologic ====   -H/H: 11.1/32.9  -CBC, chem in AM  -DVT ppx: HSQ 5000u q8h and SCDs    ==== Disposition Planning ====  Telemetry  Problem 1.  ?New hematoma under skin.  ?Related to previous aortic surgery or not.  Dr. Marie reviewed paper report from OSH.  Non-toxic appearing.  WBC normal, afebrile.  Plan today per Dr. Marie- Repeat CT scan, 3-phase C/A/P.      Problem 2.  HTN.  Labetolol and Norvasc.      Problem 3.  UTI, +UA.  UCx with next void.      Problem 4.  HLD. Statin        Tele  Full CODE   73 y/o male poor historian who lives in a nursing home with PMHx type B aortic dissection 2015, s/p TEVAR , HTN, asthma, BPH who presented to [facility] with new onset bruising left upper epigastric area, just under rib cage associated w/ pain to touch.  Came on about 5 days ago.  Denies traumatic fall or other accident/injuries. Not on anticoagulants, just ASA.  No other significant symptoms.  Subsequent CTA chest showed minimal extravasation of contrast/endovascular leak, normal post-operative changes suggestive of reimplantation of great vessels.      ==== Neurovascular ====  -No delirium, pain well managed on current regimen  -C/w PRNs for Pain control  -Monitor neuro status    ==== Respiratory ====  -Saturates well on RA     -AM CXR stable, repeat in AM  -Encourage IS 10x/hour while awake, Cough and deep breathing exercises  -Monitor respiratory status via SpO2    ==== Cardiovascular ====  Monitor on Tele  Continue aspirin 81mg QD  Continue Statin  Continue Labetalol 200mg QD  Continue amlodipine 10mg QD    ==== GI ====   -Tolerating PO  -Prophylaxis: Protonix  -C/w bowel regimen    ==== /Renal ====  -BUN/Cr: 34/1.97, hydrate with IVF, stop bactrim, repeat at 12pm  -Trend Cr on AM labs  -Replete electrolytes as needed      ==== ID ====   Afebrile, asymptomatic  -WBC: 4.94  -Continue to monitor for SIRS/Sepsis syndrome while inpatient  -UA positive, started on bactrim, got YANIRA, bactrim stopped, will start ciprol 500mg Q12hrs for 5 days    ==== Endocrine ====   -A1C: no h/o DM  -TSH: no h/o thyroid disease     ==== Hematologic ====   -H/H: 11.1/32.9  -CBC, chem in AM  -DVT ppx: HSQ 5000u q8h and SCDs    ==== Disposition Planning ====  Telemetry

## 2022-04-29 PROBLEM — N40.0 BENIGN PROSTATIC HYPERPLASIA WITHOUT LOWER URINARY TRACT SYMPTOMS: Chronic | Status: ACTIVE | Noted: 2022-04-27

## 2022-04-29 PROBLEM — I10 ESSENTIAL (PRIMARY) HYPERTENSION: Chronic | Status: ACTIVE | Noted: 2022-04-27

## 2022-04-29 PROBLEM — Z86.79 PERSONAL HISTORY OF OTHER DISEASES OF THE CIRCULATORY SYSTEM: Chronic | Status: ACTIVE | Noted: 2022-04-27

## 2022-04-29 LAB
-  AMPICILLIN/SULBACTAM: SIGNIFICANT CHANGE UP
-  AMPICILLIN: SIGNIFICANT CHANGE UP
-  CEFAZOLIN: SIGNIFICANT CHANGE UP
-  CEFTRIAXONE: SIGNIFICANT CHANGE UP
-  CIPROFLOXACIN: SIGNIFICANT CHANGE UP
-  ERTAPENEM: SIGNIFICANT CHANGE UP
-  GENTAMICIN: SIGNIFICANT CHANGE UP
-  NITROFURANTOIN: SIGNIFICANT CHANGE UP
-  PIPERACILLIN/TAZOBACTAM: SIGNIFICANT CHANGE UP
-  TOBRAMYCIN: SIGNIFICANT CHANGE UP
-  TRIMETHOPRIM/SULFAMETHOXAZOLE: SIGNIFICANT CHANGE UP
ANION GAP SERPL CALC-SCNC: 10 MMOL/L — SIGNIFICANT CHANGE UP (ref 5–17)
ANION GAP SERPL CALC-SCNC: 14 MMOL/L — SIGNIFICANT CHANGE UP (ref 5–17)
BUN SERPL-MCNC: 39 MG/DL — HIGH (ref 7–23)
BUN SERPL-MCNC: 43 MG/DL — HIGH (ref 7–23)
CALCIUM SERPL-MCNC: 8.7 MG/DL — SIGNIFICANT CHANGE UP (ref 8.4–10.5)
CALCIUM SERPL-MCNC: 8.8 MG/DL — SIGNIFICANT CHANGE UP (ref 8.4–10.5)
CHLORIDE SERPL-SCNC: 107 MMOL/L — SIGNIFICANT CHANGE UP (ref 96–108)
CHLORIDE SERPL-SCNC: 108 MMOL/L — SIGNIFICANT CHANGE UP (ref 96–108)
CO2 SERPL-SCNC: 21 MMOL/L — LOW (ref 22–31)
CO2 SERPL-SCNC: 22 MMOL/L — SIGNIFICANT CHANGE UP (ref 22–31)
CREAT SERPL-MCNC: 2.25 MG/DL — HIGH (ref 0.5–1.3)
CREAT SERPL-MCNC: 2.36 MG/DL — HIGH (ref 0.5–1.3)
CULTURE RESULTS: SIGNIFICANT CHANGE UP
EGFR: 29 ML/MIN/1.73M2 — LOW
EGFR: 30 ML/MIN/1.73M2 — LOW
GLUCOSE SERPL-MCNC: 167 MG/DL — HIGH (ref 70–99)
GLUCOSE SERPL-MCNC: 96 MG/DL — SIGNIFICANT CHANGE UP (ref 70–99)
HCT VFR BLD CALC: 27.5 % — LOW (ref 39–50)
HCT VFR BLD CALC: 30.5 % — LOW (ref 39–50)
HGB BLD-MCNC: 10.3 G/DL — LOW (ref 13–17)
HGB BLD-MCNC: 9.4 G/DL — LOW (ref 13–17)
MAGNESIUM SERPL-MCNC: 2 MG/DL — SIGNIFICANT CHANGE UP (ref 1.6–2.6)
MCHC RBC-ENTMCNC: 26.6 PG — LOW (ref 27–34)
MCHC RBC-ENTMCNC: 26.8 PG — LOW (ref 27–34)
MCHC RBC-ENTMCNC: 33.8 GM/DL — SIGNIFICANT CHANGE UP (ref 32–36)
MCHC RBC-ENTMCNC: 34.2 GM/DL — SIGNIFICANT CHANGE UP (ref 32–36)
MCV RBC AUTO: 78.3 FL — LOW (ref 80–100)
MCV RBC AUTO: 78.8 FL — LOW (ref 80–100)
METHOD TYPE: SIGNIFICANT CHANGE UP
NRBC # BLD: 0 /100 WBCS — SIGNIFICANT CHANGE UP (ref 0–0)
NRBC # BLD: 0 /100 WBCS — SIGNIFICANT CHANGE UP (ref 0–0)
ORGANISM # SPEC MICROSCOPIC CNT: SIGNIFICANT CHANGE UP
ORGANISM # SPEC MICROSCOPIC CNT: SIGNIFICANT CHANGE UP
PLATELET # BLD AUTO: 127 K/UL — LOW (ref 150–400)
PLATELET # BLD AUTO: 127 K/UL — LOW (ref 150–400)
POTASSIUM SERPL-MCNC: 4.6 MMOL/L — SIGNIFICANT CHANGE UP (ref 3.5–5.3)
POTASSIUM SERPL-MCNC: 4.6 MMOL/L — SIGNIFICANT CHANGE UP (ref 3.5–5.3)
POTASSIUM SERPL-SCNC: 4.6 MMOL/L — SIGNIFICANT CHANGE UP (ref 3.5–5.3)
POTASSIUM SERPL-SCNC: 4.6 MMOL/L — SIGNIFICANT CHANGE UP (ref 3.5–5.3)
RBC # BLD: 3.51 M/UL — LOW (ref 4.2–5.8)
RBC # BLD: 3.87 M/UL — LOW (ref 4.2–5.8)
RBC # FLD: 15.4 % — HIGH (ref 10.3–14.5)
RBC # FLD: 15.9 % — HIGH (ref 10.3–14.5)
SARS-COV-2 RNA SPEC QL NAA+PROBE: SIGNIFICANT CHANGE UP
SODIUM SERPL-SCNC: 140 MMOL/L — SIGNIFICANT CHANGE UP (ref 135–145)
SODIUM SERPL-SCNC: 142 MMOL/L — SIGNIFICANT CHANGE UP (ref 135–145)
SPECIMEN SOURCE: SIGNIFICANT CHANGE UP
WBC # BLD: 4.93 K/UL — SIGNIFICANT CHANGE UP (ref 3.8–10.5)
WBC # BLD: 5.23 K/UL — SIGNIFICANT CHANGE UP (ref 3.8–10.5)
WBC # FLD AUTO: 4.93 K/UL — SIGNIFICANT CHANGE UP (ref 3.8–10.5)
WBC # FLD AUTO: 5.23 K/UL — SIGNIFICANT CHANGE UP (ref 3.8–10.5)

## 2022-04-29 PROCEDURE — 99232 SBSQ HOSP IP/OBS MODERATE 35: CPT

## 2022-04-29 RX ORDER — SENNA PLUS 8.6 MG/1
2 TABLET ORAL AT BEDTIME
Refills: 0 | Status: DISCONTINUED | OUTPATIENT
Start: 2022-04-29 | End: 2022-05-03

## 2022-04-29 RX ADMIN — SODIUM CHLORIDE 3 MILLILITER(S): 9 INJECTION INTRAMUSCULAR; INTRAVENOUS; SUBCUTANEOUS at 13:29

## 2022-04-29 RX ADMIN — FAMOTIDINE 20 MILLIGRAM(S): 10 INJECTION INTRAVENOUS at 17:32

## 2022-04-29 RX ADMIN — Medication 200 MILLIGRAM(S): at 13:59

## 2022-04-29 RX ADMIN — CEFTRIAXONE 100 MILLIGRAM(S): 500 INJECTION, POWDER, FOR SOLUTION INTRAMUSCULAR; INTRAVENOUS at 07:59

## 2022-04-29 RX ADMIN — HEPARIN SODIUM 5000 UNIT(S): 5000 INJECTION INTRAVENOUS; SUBCUTANEOUS at 14:00

## 2022-04-29 RX ADMIN — Medication 200 MILLIGRAM(S): at 22:35

## 2022-04-29 RX ADMIN — SIMVASTATIN 40 MILLIGRAM(S): 20 TABLET, FILM COATED ORAL at 22:35

## 2022-04-29 RX ADMIN — Medication 200 MILLIGRAM(S): at 05:59

## 2022-04-29 RX ADMIN — SODIUM CHLORIDE 3 MILLILITER(S): 9 INJECTION INTRAMUSCULAR; INTRAVENOUS; SUBCUTANEOUS at 21:12

## 2022-04-29 RX ADMIN — SODIUM CHLORIDE 50 MILLILITER(S): 9 INJECTION INTRAMUSCULAR; INTRAVENOUS; SUBCUTANEOUS at 07:59

## 2022-04-29 RX ADMIN — SODIUM CHLORIDE 3 MILLILITER(S): 9 INJECTION INTRAMUSCULAR; INTRAVENOUS; SUBCUTANEOUS at 05:54

## 2022-04-29 RX ADMIN — HEPARIN SODIUM 5000 UNIT(S): 5000 INJECTION INTRAVENOUS; SUBCUTANEOUS at 22:35

## 2022-04-29 RX ADMIN — FAMOTIDINE 20 MILLIGRAM(S): 10 INJECTION INTRAVENOUS at 06:00

## 2022-04-29 RX ADMIN — HEPARIN SODIUM 5000 UNIT(S): 5000 INJECTION INTRAVENOUS; SUBCUTANEOUS at 06:00

## 2022-04-29 RX ADMIN — AMLODIPINE BESYLATE 10 MILLIGRAM(S): 2.5 TABLET ORAL at 06:00

## 2022-04-29 NOTE — DIETITIAN INITIAL EVALUATION ADULT - PERTINENT MEDS FT
MEDICATIONS  (STANDING):  amLODIPine   Tablet 10 milliGRAM(s) Oral daily  cefTRIAXone   IVPB 1000 milliGRAM(s) IV Intermittent every 24 hours  famotidine    Tablet 20 milliGRAM(s) Oral two times a day  heparin   Injectable 5000 Unit(s) SubCutaneous every 8 hours  labetalol 200 milliGRAM(s) Oral every 8 hours  simvastatin 40 milliGRAM(s) Oral at bedtime  sodium chloride 0.9% lock flush 3 milliLiter(s) IV Push every 8 hours  sodium chloride 0.9%. 1000 milliLiter(s) (50 mL/Hr) IV Continuous <Continuous>    MEDICATIONS  (PRN):  ALBUTerol    90 MICROgram(s) HFA Inhaler 2 Puff(s) Inhalation two times a day PRN Shortness of Breath and/or Wheezing  albuterol/ipratropium for Nebulization 3 milliLiter(s) Nebulizer every 6 hours PRN Shortness of Breath and/or Wheezing

## 2022-04-29 NOTE — PHYSICAL THERAPY INITIAL EVALUATION ADULT - PERTINENT HX OF CURRENT PROBLEM, REHAB EVAL
Patient is a 72 year old male who presented to [facility] with an epigastric hematoma. Denies pain or traumatic fall, not on anticoagulants. No other significant symptoms. Subsequent CTA chest showed minimal extravasation of contrast/endovascular leak, normal post-operative changes suggestive of reimplantation of great vessels

## 2022-04-29 NOTE — PHYSICAL THERAPY INITIAL EVALUATION ADULT - GENERAL OBSERVATIONS, REHAB EVAL
Spoke with RN Kylie who cleared for OOB. Patient received sitting in chair in NAD with +ECG +heplock.

## 2022-04-29 NOTE — DIETITIAN INITIAL EVALUATION ADULT - OTHER INFO
71 y/o male poor historian, lives in a nursing home PMH type B aortic dissection 2015, s/p TEVAR, HTN, asthma, BPH who presented to [facility] with an epigastric hematoma. Subsequent CTA chest showed minimal extravasation of contrast/endovascular leak, normal post-operative changes suggestive of reimplantation of great vessels. Transferred to [facility] under Dr. Marie for evaluation and management.    Pt seen alert in room. Consumed 100% of breakfast this AM. Reports good PO of low salt diet PTA as well. Denies NVDC, +BM this AM, Pepcid ordered. NKFA. No issues chewing/swallowing. No pain. Sav 20. No edema or pressure ulcers.   Please see below for nutritions recommendations.

## 2022-04-29 NOTE — PHYSICAL THERAPY INITIAL EVALUATION ADULT - ADDITIONAL COMMENTS
Patient lives in assisted living facility. He reports he walks with walker at baseline and requires assistance with ADLs.

## 2022-04-29 NOTE — PROGRESS NOTE ADULT - SUBJECTIVE AND OBJECTIVE BOX
Patient discussed on morning rounds with Dr. Marie    Operation / Date: TEVAR r/o endoleak (small endoleak on CTA)    SUBJECTIVE ASSESSMENT:  72y Male seen and examined. Patient has no complaints and says he feels well. Tolerating PO diet, satting well on room air, ambulating with walker. Denies lightheadedness, headache CP, palpitations, SOB, abdominal pain, nausea, vomiting, fever, chills.    Vital Signs Last 24 Hrs  T(C): 36.7 (29 Apr 2022 13:26), Max: 36.9 (29 Apr 2022 05:00)  T(F): 98.1 (29 Apr 2022 13:26), Max: 98.4 (29 Apr 2022 05:00)  HR: 82 (29 Apr 2022 17:05) (74 - 111)  BP: 130/63 (29 Apr 2022 17:05) (117/56 - 163/70)  BP(mean): 89 (29 Apr 2022 17:05) (79 - 101)  RR: 20 (29 Apr 2022 17:05) (13 - 25)  SpO2: 100% (29 Apr 2022 17:05) (95% - 100%)  I&O's Detail    28 Apr 2022 07:01  -  29 Apr 2022 07:00  --------------------------------------------------------  IN:    IV PiggyBack: 900 mL    Oral Fluid: 240 mL  Total IN: 1140 mL    OUT:    Voided (mL): 1200 mL  Total OUT: 1200 mL    Total NET: -60 mL      29 Apr 2022 07:01  -  29 Apr 2022 17:46  --------------------------------------------------------  IN:    Oral Fluid: 675 mL    sodium chloride 0.9%: 400 mL  Total IN: 1075 mL    OUT:    Voided (mL): 900 mL  Total OUT: 900 mL    Total NET: 175 mL        CHEST TUBE:  no  MATTHEW DRAIN:  No.  EPICARDIAL WIRES: No.  TIE DOWNS: No.  GUAJARDO: No.    PHYSICAL EXAM:    General: NAD, sitting comfortably in bed, conversing appropriately  Neurological: alert and oriented, UE and LE strength equal b/l, facial symmetry present  Cardiovascular: RRR, Clear S1 and S2, no murmurs appreciated  Respiratory: chest expansion symmetrical, CTA b/l, no wheezing noted  Gastrointestinal: +BS, soft, NT, ND, ecchymosis under L chest and to flank (about [id number]) no surround warmth or erythema  Extremities: moving spontaneously, no calf tenderness or edema.  Vascular: warm, well perfused. DP/PT pulses palpable b/l.  Incisions: none      LABS:                        10.3   5.23  )-----------( 127      ( 29 Apr 2022 12:12 )             30.5       COUMADIN: no        04-29    142  |  107  |  39<H>  ----------------------------<  167<H>  4.6   |  21<L>  |  2.25<H>    Ca    8.7      29 Apr 2022 12:12  Mg     2.0     04-29            MEDICATIONS  (STANDING):  amLODIPine   Tablet 10 milliGRAM(s) Oral daily  cefTRIAXone   IVPB 1000 milliGRAM(s) IV Intermittent every 24 hours  famotidine    Tablet 20 milliGRAM(s) Oral two times a day  heparin   Injectable 5000 Unit(s) SubCutaneous every 8 hours  labetalol 200 milliGRAM(s) Oral every 8 hours  simvastatin 40 milliGRAM(s) Oral at bedtime  sodium chloride 0.9% lock flush 3 milliLiter(s) IV Push every 8 hours  sodium chloride 0.9%. 1000 milliLiter(s) (50 mL/Hr) IV Continuous <Continuous>    MEDICATIONS  (PRN):  ALBUTerol    90 MICROgram(s) HFA Inhaler 2 Puff(s) Inhalation two times a day PRN Shortness of Breath and/or Wheezing  albuterol/ipratropium for Nebulization 3 milliLiter(s) Nebulizer every 6 hours PRN Shortness of Breath and/or Wheezing        RADIOLOGY & ADDITIONAL TESTS:

## 2022-04-29 NOTE — PROGRESS NOTE ADULT - ASSESSMENT
71 y/o male poor historian who lives in a nursing home with PMHx type B aortic dissection 2015, s/p TEVAR , HTN, asthma, BPH who presented to [facility] with new onset bruising left upper epigastric area, just under rib cage associated w/ pain to touch.  Came on about 5 days before.  Subsequent CTA chest showed minimal extravasation of contrast/endovascular leak, normal post-operative changes suggestive of reimplantation of great vessels. Patient was transferred to [facility] for evaluation with Dr. Marie.  Incidental UTI noted, Bactrim started causing YANIRA. Creatinine trending down now, continue gentle IVF. No surgical intervention needed at this time, patient pending discharge to nursing facility.    Plan:    Neurovascular:   -No pain at this time    Cardiovascular:   Type B aortic dissection s/p TEVAR 2015 and new small endoleak  -medical management  -continue amlodipine  -continue labetolol  HLD  -continue simvastatin  -Hemodynamically stable.   -Monitor: BP, HR, tele    Respiratory:   -Oxygenating well on room air  -Encourage continued use of IS 10x/hr and frequent ambulation  hx asthma  -continue albuterol and duoneb PRN    GI:  -GI PPX: continue famotidine  -PO Diet  -Bowel Regimen: start senna    Renal / :  YANIRA 2/2 Bactrim  -Continue to monitor renal function: BUN/Cr: 39/2.25  -trending down from 37/2.47  -continue gentle IVF  -Monitor I/O's daily     Endocrine:    -No hx of DM or thyroid dx  -A1c: 5.6  -TSH: 2.040    Hematologic:  -CBC: H/H- 9.4/27.5  -Coagulation Panel in AM    ID:  -Temperature: afebrile  -CBC: WBC- 4.93  -Continue to observe for SIRS/Sepsis Syndrome.  UTI  -Bactrim started and stopped 2/2 YANIRA  -continue ceftriaxone    Prophylaxis:  -DVT prophylaxis with 5000 SubQ Heparin q8h.  -Continue with SCD's b/l while patient is at rest     Disposition:  -Discharge to nursing home once patient is medically ready

## 2022-04-29 NOTE — DIETITIAN INITIAL EVALUATION ADULT - OTHER CALCULATIONS
5'2''  pounds+-10%  Weight 154, BMI 28.2, %OLJ498.5  IBW used to calculate energy needs due to pt's current body weight exceeding 120% of IBW  Adjusted for age and current conditions

## 2022-04-29 NOTE — DIETITIAN INITIAL EVALUATION ADULT - ADD RECOMMEND
1. Monitor PO intake/appetite, GI distress, diet tolerance, labs, weights.  2. Honor pt food preferences as able.  3. RD to remain available for additional nutrition interventions as needed.  ** Moderate Nutrition Risk.

## 2022-04-29 NOTE — DIETITIAN INITIAL EVALUATION ADULT - PERTINENT LABORATORY DATA
04-29    142  |  107  |  39<H>  ----------------------------<  167<H>  4.6   |  21<L>  |  2.25<H>    Ca    8.7      29 Apr 2022 12:12  Mg     2.0     04-29    A1C with Estimated Average Glucose Result: 5.6 % (04-27-22 @ 02:20)  Lipids WDL

## 2022-04-30 ENCOUNTER — TRANSCRIPTION ENCOUNTER (OUTPATIENT)
Age: 72
End: 2022-04-30

## 2022-04-30 LAB
ANION GAP SERPL CALC-SCNC: 9 MMOL/L — SIGNIFICANT CHANGE UP (ref 5–17)
APPEARANCE UR: CLEAR — SIGNIFICANT CHANGE UP
APTT BLD: 29.4 SEC — SIGNIFICANT CHANGE UP (ref 27.5–35.5)
BACTERIA # UR AUTO: SIGNIFICANT CHANGE UP /HPF
BILIRUB UR-MCNC: NEGATIVE — SIGNIFICANT CHANGE UP
BUN SERPL-MCNC: 40 MG/DL — HIGH (ref 7–23)
CALCIUM SERPL-MCNC: 9.1 MG/DL — SIGNIFICANT CHANGE UP (ref 8.4–10.5)
CHLORIDE SERPL-SCNC: 111 MMOL/L — HIGH (ref 96–108)
CO2 SERPL-SCNC: 21 MMOL/L — LOW (ref 22–31)
COLOR SPEC: YELLOW — SIGNIFICANT CHANGE UP
CREAT SERPL-MCNC: 1.96 MG/DL — HIGH (ref 0.5–1.3)
DIFF PNL FLD: ABNORMAL
EGFR: 36 ML/MIN/1.73M2 — LOW
EPI CELLS # UR: SIGNIFICANT CHANGE UP /HPF (ref 0–5)
GLUCOSE SERPL-MCNC: 93 MG/DL — SIGNIFICANT CHANGE UP (ref 70–99)
GLUCOSE UR QL: NEGATIVE — SIGNIFICANT CHANGE UP
HCT VFR BLD CALC: 28.3 % — LOW (ref 39–50)
HGB BLD-MCNC: 9.6 G/DL — LOW (ref 13–17)
INR BLD: 1.08 — SIGNIFICANT CHANGE UP (ref 0.88–1.16)
KETONES UR-MCNC: NEGATIVE — SIGNIFICANT CHANGE UP
LEUKOCYTE ESTERASE UR-ACNC: ABNORMAL
MAGNESIUM SERPL-MCNC: 1.9 MG/DL — SIGNIFICANT CHANGE UP (ref 1.6–2.6)
MCHC RBC-ENTMCNC: 26.6 PG — LOW (ref 27–34)
MCHC RBC-ENTMCNC: 33.9 GM/DL — SIGNIFICANT CHANGE UP (ref 32–36)
MCV RBC AUTO: 78.4 FL — LOW (ref 80–100)
NITRITE UR-MCNC: NEGATIVE — SIGNIFICANT CHANGE UP
NRBC # BLD: 0 /100 WBCS — SIGNIFICANT CHANGE UP (ref 0–0)
PH UR: 6 — SIGNIFICANT CHANGE UP (ref 5–8)
PLATELET # BLD AUTO: 118 K/UL — LOW (ref 150–400)
POTASSIUM SERPL-MCNC: 4.9 MMOL/L — SIGNIFICANT CHANGE UP (ref 3.5–5.3)
POTASSIUM SERPL-SCNC: 4.9 MMOL/L — SIGNIFICANT CHANGE UP (ref 3.5–5.3)
PROT UR-MCNC: 30 MG/DL
PROTHROM AB SERPL-ACNC: 12.9 SEC — SIGNIFICANT CHANGE UP (ref 10.5–13.4)
RBC # BLD: 3.61 M/UL — LOW (ref 4.2–5.8)
RBC # FLD: 15.5 % — HIGH (ref 10.3–14.5)
RBC CASTS # UR COMP ASSIST: > 10 /HPF
SODIUM SERPL-SCNC: 141 MMOL/L — SIGNIFICANT CHANGE UP (ref 135–145)
SP GR SPEC: 1.02 — SIGNIFICANT CHANGE UP (ref 1–1.03)
UROBILINOGEN FLD QL: 0.2 E.U./DL — SIGNIFICANT CHANGE UP
WBC # BLD: 4.83 K/UL — SIGNIFICANT CHANGE UP (ref 3.8–10.5)
WBC # FLD AUTO: 4.83 K/UL — SIGNIFICANT CHANGE UP (ref 3.8–10.5)
WBC UR QL: ABNORMAL /HPF

## 2022-04-30 PROCEDURE — 99232 SBSQ HOSP IP/OBS MODERATE 35: CPT

## 2022-04-30 RX ORDER — PIPERACILLIN AND TAZOBACTAM 4; .5 G/20ML; G/20ML
2.25 INJECTION, POWDER, LYOPHILIZED, FOR SOLUTION INTRAVENOUS EVERY 6 HOURS
Refills: 0 | Status: DISCONTINUED | OUTPATIENT
Start: 2022-05-01 | End: 2022-05-03

## 2022-04-30 RX ORDER — VANCOMYCIN HCL 1 G
1000 VIAL (EA) INTRAVENOUS EVERY 24 HOURS
Refills: 0 | Status: DISCONTINUED | OUTPATIENT
Start: 2022-05-01 | End: 2022-05-01

## 2022-04-30 RX ORDER — TAMSULOSIN HYDROCHLORIDE 0.4 MG/1
0.4 CAPSULE ORAL AT BEDTIME
Refills: 0 | Status: DISCONTINUED | OUTPATIENT
Start: 2022-04-30 | End: 2022-05-03

## 2022-04-30 RX ORDER — VANCOMYCIN HCL 1 G
1000 VIAL (EA) INTRAVENOUS EVERY 12 HOURS
Refills: 0 | Status: DISCONTINUED | OUTPATIENT
Start: 2022-04-30 | End: 2022-04-30

## 2022-04-30 RX ORDER — LABETALOL HCL 100 MG
1 TABLET ORAL
Qty: 90 | Refills: 0
Start: 2022-04-30 | End: 2022-05-29

## 2022-04-30 RX ADMIN — SODIUM CHLORIDE 50 MILLILITER(S): 9 INJECTION INTRAMUSCULAR; INTRAVENOUS; SUBCUTANEOUS at 02:56

## 2022-04-30 RX ADMIN — CEFTRIAXONE 100 MILLIGRAM(S): 500 INJECTION, POWDER, FOR SOLUTION INTRAMUSCULAR; INTRAVENOUS at 07:11

## 2022-04-30 RX ADMIN — HEPARIN SODIUM 5000 UNIT(S): 5000 INJECTION INTRAVENOUS; SUBCUTANEOUS at 14:04

## 2022-04-30 RX ADMIN — Medication 200 MILLIGRAM(S): at 21:42

## 2022-04-30 RX ADMIN — SIMVASTATIN 40 MILLIGRAM(S): 20 TABLET, FILM COATED ORAL at 21:41

## 2022-04-30 RX ADMIN — FAMOTIDINE 20 MILLIGRAM(S): 10 INJECTION INTRAVENOUS at 17:09

## 2022-04-30 RX ADMIN — Medication 250 MILLIGRAM(S): at 14:32

## 2022-04-30 RX ADMIN — SODIUM CHLORIDE 3 MILLILITER(S): 9 INJECTION INTRAMUSCULAR; INTRAVENOUS; SUBCUTANEOUS at 21:20

## 2022-04-30 RX ADMIN — SODIUM CHLORIDE 3 MILLILITER(S): 9 INJECTION INTRAMUSCULAR; INTRAVENOUS; SUBCUTANEOUS at 13:42

## 2022-04-30 RX ADMIN — HEPARIN SODIUM 5000 UNIT(S): 5000 INJECTION INTRAVENOUS; SUBCUTANEOUS at 21:41

## 2022-04-30 RX ADMIN — TAMSULOSIN HYDROCHLORIDE 0.4 MILLIGRAM(S): 0.4 CAPSULE ORAL at 21:51

## 2022-04-30 NOTE — DISCHARGE NOTE PROVIDER - NSDCFUADDINST_GEN_ALL_CORE_FT
-You are being discharged home on antibiotics because you have a urinary tract infection (UTI) while you were in the hospital. It is important that you complete this entire course. If your discomfort with urination does not improve even after the antibiotic course, please see your primary care doctor or if you have difficulty finding your primary care doctor call our office for further assistance.     -Walk daily as tolerated and use your incentive spirometer 10 times every hour while you are awake.     -Please weigh yourself daily. If you notice over a 3 pound weight gain in 3 days, this is a sign you are likely retaining too much fluid. It is imperative you call our right away with unexplained rapid weight gain.      -Please continue to wear the compression stockings given to you in the hospital at home. This is a way to prevent fluid from building up in your legs.     -No driving or strenuous activity/exercise until cleared by your surgeon.    -Gently clean your incisions with unscented/antibacterial soap and water, pat dry.  You may leave them open to air.    -Call your doctor if you have shortness of breath, chest pain not relieved by pain medication, dizziness, fever >100.5, or increased redness or drainage from incisions.   -Your blood pressure medication was decreased as your blood pressure was low in the hospital. Please only take your medication as prescribed today until seen in clinic.    -Walk daily as tolerated and use your incentive spirometer 10 times every hour while you are awake.     -Please weigh yourself daily. If you notice over a 3 pound weight gain in 3 days, this is a sign you are likely retaining too much fluid. It is imperative you call our right away with unexplained rapid weight gain.      -Please continue to wear the compression stockings given to you in the hospital at home. This is a way to prevent fluid from building up in your legs.     -No driving or strenuous activity/exercise until cleared by your surgeon.    -Gently clean your incisions with unscented/antibacterial soap and water, pat dry.  You may leave them open to air.    -Call your doctor if you have shortness of breath, chest pain not relieved by pain medication, dizziness, fever >100.5, or increased redness or drainage from incisions.

## 2022-04-30 NOTE — PROGRESS NOTE ADULT - SUBJECTIVE AND OBJECTIVE BOX
Patient discussed on morning rounds with Dr. Frank Meek of [facility] in 2015  readmission with endoleak     SUBJECTIVE ASSESSMENT:  Pt is feeling well today. States he still has burning with urination that has remained the same since admission. Otherwise no complaints. Eating/drinking well. Moving bowels regularly. Denies any CP, palpitations, SOB, wheezing, abd pain, n/v/d/c, fevers or chills.     Vital Signs Last 24 Hrs  T(C): 36.7 (30 Apr 2022 09:13), Max: 36.8 (29 Apr 2022 18:03)  T(F): 98.1 (30 Apr 2022 09:13), Max: 98.3 (29 Apr 2022 21:56)  HR: 72 (30 Apr 2022 08:15) (72 - 89)  BP: 130/62 (30 Apr 2022 08:15) (94/65 - 143/65)  BP(mean): 89 (30 Apr 2022 08:15) (74 - 93)  RR: 14 (30 Apr 2022 08:15) (14 - 20)  SpO2: 98% (30 Apr 2022 08:15) (97% - 100%)  I&O's Detail    29 Apr 2022 07:01  -  30 Apr 2022 07:00  --------------------------------------------------------  IN:    Oral Fluid: 1092 mL    sodium chloride 0.9%: 950 mL  Total IN: 2042 mL    OUT:    Voided (mL): 2400 mL  Total OUT: 2400 mL    Total NET: -358 mL    30 Apr 2022 07:01  -  30 Apr 2022 12:37  --------------------------------------------------------  IN:    sodium chloride 0.9%: 200 mL  Total IN: 200 mL    OUT:    Voided (mL): 675 mL  Total OUT: 675 mL    Total NET: -475 mL    CHEST TUBE:  no  MATTHEW DRAIN:  no  EPICARDIAL WIRES: no  TIE DOWNS: no  GUAJARDO: no    PHYSICAL EXAM:  General: well appearing sitting up in chair in NAD   HEENT: normocephalic, atraumatic, PERRL   Neurological: AOx3. Motor skills grossly intact  Cardiovascular: Normal S1/S2. Regular rate/rhythm. + systolic murmur   Respiratory: Lungs CTA bilaterally. No wheezing or rales  Gastrointestinal: +BS in all 4 quadrants. Non-distended. Soft. Non-tender  Extremities: Strength 5/5 b/l upper/lower extremities. Sensation grossly intact upper/lower extremities. No edema. No calf tenderness.  Vascular: Radial 2+bilaterally, DP 2+ b/l  Incision Sites: none     LABS:                        9.6    4.83  )-----------( 118      ( 30 Apr 2022 05:56 )             28.3     COUMADIN:  no    PT/INR - ( 30 Apr 2022 05:56 )   PT: 12.9 sec;   INR: 1.08     PTT - ( 30 Apr 2022 05:56 )  PTT:29.4 sec    04-30    141  |  111<H>  |  40<H>  ----------------------------<  93  4.9   |  21<L>  |  1.96<H>    Ca    9.1      30 Apr 2022 05:56  Mg     1.9     04-30    MEDICATIONS  (STANDING):  amLODIPine   Tablet 10 milliGRAM(s) Oral daily  cefTRIAXone   IVPB 1000 milliGRAM(s) IV Intermittent every 24 hours  famotidine    Tablet 20 milliGRAM(s) Oral two times a day  heparin   Injectable 5000 Unit(s) SubCutaneous every 8 hours  labetalol 200 milliGRAM(s) Oral every 8 hours  senna 2 Tablet(s) Oral at bedtime  simvastatin 40 milliGRAM(s) Oral at bedtime  sodium chloride 0.9% lock flush 3 milliLiter(s) IV Push every 8 hours  sodium chloride 0.9%. 1000 milliLiter(s) (50 mL/Hr) IV Continuous <Continuous>    MEDICATIONS  (PRN):  ALBUTerol    90 MICROgram(s) HFA Inhaler 2 Puff(s) Inhalation two times a day PRN Shortness of Breath and/or Wheezing  albuterol/ipratropium for Nebulization 3 milliLiter(s) Nebulizer every 6 hours PRN Shortness of Breath and/or Wheezing    RADIOLOGY & ADDITIONAL TESTS:  < from: Xray Chest 1 View- PORTABLE-Routine (Xray Chest 1 View- PORTABLE-Routine .) (04.27.22 @ 07:05) >  IMPRESSION: Widened mediastinum. Dilated aortic knob.  < end of copied text >

## 2022-04-30 NOTE — DISCHARGE NOTE PROVIDER - NSDCCPCAREPLAN_GEN_ALL_CORE_FT
PRINCIPAL DISCHARGE DIAGNOSIS  Diagnosis: Dissecting aneurysm of thoracic aorta, [place] type B  Assessment and Plan of Treatment: s/p TEVAR now with small with endoleak. No surgical intervention required at this time.      SECONDARY DISCHARGE DIAGNOSES  Diagnosis: Urinary tract infection  Assessment and Plan of Treatment: please continue with antibiotics

## 2022-04-30 NOTE — DISCHARGE NOTE PROVIDER - NSDCFUADDAPPT_GEN_ALL_CORE_FT
-Please attend your follow up appointment with Dr. Marie. Our office will call you with your appointment time. If you don't hear from the office with your appointment by Monday May 2nd, please call [phone] to receive your appointment.  -Please follow up with your primary care provider regarding your blood pressure medication.

## 2022-04-30 NOTE — PROGRESS NOTE ADULT - ASSESSMENT
71 y/o M poor historian who lives in a nursing home with PMHx type B aortic dissection 2015, s/p TEVAR , HTN, asthma, BPH who presented to [facility] with new onset bruising left upper epigastric area, just under rib cage associated w/ pain to touch for 5 days prior to admission. Subsequent CTA chest showed minimal extravasation of contrast/endovascular leak, normal post-operative changes suggestive of reimplantation of great vessels. Patient was transferred to [facility] for evaluation with Dr. Marie. Of note, Incidental UTI noted, Bactrim started causing YANIRA. Creatinine trending down now, continue gentle IVF. No surgical intervention needed at this time, patient pending discharge to nursing facility. Keeping in house for IV abx for at least one more day pending repeat UCx and UA.     Plan:    Neurovascular:   -stable, no active issues     Cardiovascular:   -hx of TEVAR, readmission with small endoleak    -medical mgt with BP control only    -continue with labetalol 200mg Q8 hours    -continue with amlodipine 10mg daily   -HLD: continue with statin therapy   -Hemodynamically stable.   -Monitor: BP, HR, tele    Respiratory:   -hx of Asthma, stable at this time   -Oxygenating well on room air  -Encourage continued use of IS 10x/hr and frequent ambulation  -CXR: no acute pathology, no PTX     GI:  -GI PPX: famotidine   -PO Diet: Regular   -Bowel Regimen: senna     Renal / :  -hx of BPH: complains of hesitancy with urination, starting tamsulosin low dose   -UTI this admission    -UCx growing E. Coli    -continue with ceftriaxone 1000mg IV Q24 hours for at least one more day    -pending repeat UA/UCx to ensure proper Abx regimen established before discharge     -previously was on Bactrim but developed YANIRA, was switched to cipro but sensitivity then showed resistance, was switched to ceftriaxone.  -YANIRA this admission 2/2 Bactrim - resolved   -Continue to monitor renal function: BUN/Cr 40/1.96  -Monitor I/O's daily     Endocrine:    -No hx of DM or thyroid dx  -A1c: 5.6   -TSH: 2.04     Hematologic:  -CBC: H/H- 9.6/28  -Coagulation Panel : WNL     ID:  -UTI (see renal section): continue with ceftriaxone   -Temperature: afebrile   -CBC: WBC- 4.8  -Continue to observe for SIRS/Sepsis Syndrome.    Prophylaxis:  -DVT prophylaxis with 5000 SubQ Heparin q8h.  -Continue with SCD's b/l while patient is at rest     Disposition:  -Discharge home once patient is medically ready

## 2022-04-30 NOTE — DISCHARGE NOTE PROVIDER - CARE PROVIDERS DIRECT ADDRESSES
[email or web address]

## 2022-04-30 NOTE — DISCHARGE NOTE PROVIDER - CARE PROVIDER_API CALL
Mynor Marie (MD)  Surgery; Thoracic and Cardiac Surgery  [address] [place] [address]  Phone: [phone]  Fax: [phone]  Follow Up Time:    Mynor Marie (MD)  Surgery; Thoracic and Cardiac Surgery  [address] [place] [address]  Phone: [phone]  Fax: [phone]  Scheduled Appointment: 05/18/2022 12:00 PM

## 2022-04-30 NOTE — DISCHARGE NOTE PROVIDER - PROVIDER TOKENS
PROVIDER:[TOKEN:[[id number]]] PROVIDER:[TOKEN:[[id number]],SCHEDULEDAPPT:[05/18/2022],SCHEDULEDAPPTTIME:[12:00 PM]]

## 2022-04-30 NOTE — DISCHARGE NOTE PROVIDER - HOSPITAL COURSE
71 y/o male poor historian who lives in a nursing home with PMHx type B aortic dissection 2015, s/p TEVAR , HTN, asthma, BPH who presented to [facility] with new onset bruising left upper epigastric area, just under rib cage associated w/ pain to touch.  Came on about 5 days before.  Subsequent CTA chest showed minimal extravasation of contrast/endovascular leak, normal post-operative changes suggestive of reimplantation of great vessels. Patient was transferred to [facility] for evaluation with Dr. Marie.  Incidental UTI noted, Bactrim started causing YANIRA. Creatinine trending down now, continue gentle IVF. No surgical intervention needed at this time per Dr. Marie. Okay for patient to be discharged back to assisted living facility. Pt to be discharged with completion Abx course for UTI.     Over 35 minutes was spent with the patient reviewing the discharge material including medications, follow up appointments, recovery, concerning symptoms, and how to contact their health care providers if they have questions    Of note: abx decision reviewed with Dr. Phan 71 y/o male poor historian who lives in a nursing home with PMHx type B aortic dissection 2015, s/p TEVAR , HTN, asthma, BPH who presented to [facility] with new onset bruising left upper epigastric area, just under rib cage associated w/ pain to touch.  Came on about 5 days before.  Subsequent CTA chest showed minimal extravasation of contrast/endovascular leak, normal post-operative changes suggestive of reimplantation of great vessels. Patient was transferred to [facility] for evaluation with Dr. Marie.  Incidental UTI noted, Bactrim started causing YANIRA. Creatinine trending down now, continue gentle IVF. No surgical intervention needed at this time per Dr. Marie. Okay for patient to be discharged back to assisted living facility. Pt to be discharged with completion Abx course for UTI.   *incomplete discharge*     Over 35 minutes was spent with the patient reviewing the discharge material including medications, follow up appointments, recovery, concerning symptoms, and how to contact their health care providers if they have questions    Of note: abx decision reviewed with Dr. Phan 73 y/o male poor historian who lives in a nursing home with PMHx type B aortic dissection 2015, s/p TEVAR , HTN, asthma, BPH who presented to [facility] with new onset bruising left upper epigastric area, just under rib cage associated w/ pain to touch.  Came on about 5 days before.  Subsequent CTA chest showed minimal extravasation of contrast/endovascular leak, normal post-operative changes suggestive of reimplantation of great vessels. Patient was transferred to [facility] for evaluation with Dr. Marie.  Incidental UTI noted, Bactrim started causing YANIRA. Creatinine trending down now, continue gentle IVF. No surgical intervention needed at this time per Dr. Marie. Completed 7 days of IV abx and UTI course completed. RUE hematoma noted on RUE above biceps. Vanc started for possible abscess. General surgery consulted, no surgical intervention needed at this time, abx not indicated, recommend warm compresses. Labetalol decreased 2/2 borderline hypotension. Tolerating PO diet, ambulating with walker and assist, satting well on room air. Per Dr. Marie patient is medically stable for discharge back to living facility today, 5/3/22.    Over 35 minutes was spent with the patient reviewing the discharge material including medications, follow up appointments, recovery, concerning symptoms, and how to contact their health care providers if they have questions

## 2022-04-30 NOTE — DISCHARGE NOTE PROVIDER - NSDCMRMEDTOKEN_GEN_ALL_CORE_FT
amLODIPine 10 mg oral tablet: 1 tab(s) orally once a day  labetalol 200 mg oral tablet: 2 tab(s) orally every 8 hours  labetalol 200 mg oral tablet: 1 tab(s) orally every 8 hours  rolling walker:   simvastatin 40 mg oral tablet: 1 tab(s) orally once a day (at bedtime)   labetalol 100 mg oral tablet: 1 tab(s) orally every 8 hours  rolling walker:   simvastatin 40 mg oral tablet: 1 tab(s) orally once a day (at bedtime)  tamsulosin 0.4 mg oral capsule: 1 cap(s) orally once a day (at bedtime)

## 2022-05-01 LAB
ANION GAP SERPL CALC-SCNC: 8 MMOL/L — SIGNIFICANT CHANGE UP (ref 5–17)
BUN SERPL-MCNC: 33 MG/DL — HIGH (ref 7–23)
CALCIUM SERPL-MCNC: 8.9 MG/DL — SIGNIFICANT CHANGE UP (ref 8.4–10.5)
CHLORIDE SERPL-SCNC: 109 MMOL/L — HIGH (ref 96–108)
CO2 SERPL-SCNC: 21 MMOL/L — LOW (ref 22–31)
CREAT SERPL-MCNC: 1.71 MG/DL — HIGH (ref 0.5–1.3)
CULTURE RESULTS: NO GROWTH — SIGNIFICANT CHANGE UP
EGFR: 42 ML/MIN/1.73M2 — LOW
GLUCOSE SERPL-MCNC: 97 MG/DL — SIGNIFICANT CHANGE UP (ref 70–99)
HCT VFR BLD CALC: 28 % — LOW (ref 39–50)
HGB BLD-MCNC: 9.5 G/DL — LOW (ref 13–17)
MAGNESIUM SERPL-MCNC: 1.8 MG/DL — SIGNIFICANT CHANGE UP (ref 1.6–2.6)
MCHC RBC-ENTMCNC: 26.4 PG — LOW (ref 27–34)
MCHC RBC-ENTMCNC: 33.9 GM/DL — SIGNIFICANT CHANGE UP (ref 32–36)
MCV RBC AUTO: 77.8 FL — LOW (ref 80–100)
NRBC # BLD: 0 /100 WBCS — SIGNIFICANT CHANGE UP (ref 0–0)
PLATELET # BLD AUTO: 113 K/UL — LOW (ref 150–400)
POTASSIUM SERPL-MCNC: 5 MMOL/L — SIGNIFICANT CHANGE UP (ref 3.5–5.3)
POTASSIUM SERPL-SCNC: 5 MMOL/L — SIGNIFICANT CHANGE UP (ref 3.5–5.3)
RBC # BLD: 3.6 M/UL — LOW (ref 4.2–5.8)
RBC # FLD: 15.8 % — HIGH (ref 10.3–14.5)
SODIUM SERPL-SCNC: 138 MMOL/L — SIGNIFICANT CHANGE UP (ref 135–145)
SPECIMEN SOURCE: SIGNIFICANT CHANGE UP
WBC # BLD: 4.86 K/UL — SIGNIFICANT CHANGE UP (ref 3.8–10.5)
WBC # FLD AUTO: 4.86 K/UL — SIGNIFICANT CHANGE UP (ref 3.8–10.5)

## 2022-05-01 PROCEDURE — 99232 SBSQ HOSP IP/OBS MODERATE 35: CPT

## 2022-05-01 PROCEDURE — 93971 EXTREMITY STUDY: CPT | Mod: 26,RT

## 2022-05-01 RX ORDER — VANCOMYCIN HCL 1 G
VIAL (EA) INTRAVENOUS
Refills: 0 | Status: DISCONTINUED | OUTPATIENT
Start: 2022-05-01 | End: 2022-05-03

## 2022-05-01 RX ORDER — IPRATROPIUM/ALBUTEROL SULFATE 18-103MCG
3 AEROSOL WITH ADAPTER (GRAM) INHALATION ONCE
Refills: 0 | Status: COMPLETED | OUTPATIENT
Start: 2022-05-01 | End: 2022-05-01

## 2022-05-01 RX ORDER — IPRATROPIUM/ALBUTEROL SULFATE 18-103MCG
3 AEROSOL WITH ADAPTER (GRAM) INHALATION EVERY 6 HOURS
Refills: 0 | Status: DISCONTINUED | OUTPATIENT
Start: 2022-05-01 | End: 2022-05-03

## 2022-05-01 RX ORDER — LABETALOL HCL 100 MG
100 TABLET ORAL EVERY 8 HOURS
Refills: 0 | Status: DISCONTINUED | OUTPATIENT
Start: 2022-05-01 | End: 2022-05-03

## 2022-05-01 RX ORDER — VANCOMYCIN HCL 1 G
1000 VIAL (EA) INTRAVENOUS EVERY 12 HOURS
Refills: 0 | Status: DISCONTINUED | OUTPATIENT
Start: 2022-05-02 | End: 2022-05-03

## 2022-05-01 RX ORDER — VANCOMYCIN HCL 1 G
1000 VIAL (EA) INTRAVENOUS ONCE
Refills: 0 | Status: COMPLETED | OUTPATIENT
Start: 2022-05-01 | End: 2022-05-01

## 2022-05-01 RX ADMIN — PIPERACILLIN AND TAZOBACTAM 200 GRAM(S): 4; .5 INJECTION, POWDER, LYOPHILIZED, FOR SOLUTION INTRAVENOUS at 07:04

## 2022-05-01 RX ADMIN — TAMSULOSIN HYDROCHLORIDE 0.4 MILLIGRAM(S): 0.4 CAPSULE ORAL at 21:42

## 2022-05-01 RX ADMIN — HEPARIN SODIUM 5000 UNIT(S): 5000 INJECTION INTRAVENOUS; SUBCUTANEOUS at 05:35

## 2022-05-01 RX ADMIN — SENNA PLUS 2 TABLET(S): 8.6 TABLET ORAL at 21:42

## 2022-05-01 RX ADMIN — Medication 3 MILLILITER(S): at 21:30

## 2022-05-01 RX ADMIN — Medication 200 MILLIGRAM(S): at 05:35

## 2022-05-01 RX ADMIN — FAMOTIDINE 20 MILLIGRAM(S): 10 INJECTION INTRAVENOUS at 05:34

## 2022-05-01 RX ADMIN — Medication 3 MILLILITER(S): at 12:05

## 2022-05-01 RX ADMIN — Medication 100 MILLIGRAM(S): at 21:43

## 2022-05-01 RX ADMIN — Medication 100 MILLIGRAM(S): at 13:04

## 2022-05-01 RX ADMIN — HEPARIN SODIUM 5000 UNIT(S): 5000 INJECTION INTRAVENOUS; SUBCUTANEOUS at 21:43

## 2022-05-01 RX ADMIN — SIMVASTATIN 40 MILLIGRAM(S): 20 TABLET, FILM COATED ORAL at 21:42

## 2022-05-01 RX ADMIN — PIPERACILLIN AND TAZOBACTAM 200 GRAM(S): 4; .5 INJECTION, POWDER, LYOPHILIZED, FOR SOLUTION INTRAVENOUS at 18:05

## 2022-05-01 RX ADMIN — FAMOTIDINE 20 MILLIGRAM(S): 10 INJECTION INTRAVENOUS at 18:05

## 2022-05-01 RX ADMIN — HEPARIN SODIUM 5000 UNIT(S): 5000 INJECTION INTRAVENOUS; SUBCUTANEOUS at 13:04

## 2022-05-01 RX ADMIN — SODIUM CHLORIDE 3 MILLILITER(S): 9 INJECTION INTRAMUSCULAR; INTRAVENOUS; SUBCUTANEOUS at 05:23

## 2022-05-01 RX ADMIN — Medication 3 MILLILITER(S): at 20:40

## 2022-05-01 RX ADMIN — SODIUM CHLORIDE 3 MILLILITER(S): 9 INJECTION INTRAMUSCULAR; INTRAVENOUS; SUBCUTANEOUS at 21:48

## 2022-05-01 RX ADMIN — Medication 250 MILLIGRAM(S): at 19:17

## 2022-05-01 RX ADMIN — PIPERACILLIN AND TAZOBACTAM 200 GRAM(S): 4; .5 INJECTION, POWDER, LYOPHILIZED, FOR SOLUTION INTRAVENOUS at 12:05

## 2022-05-01 RX ADMIN — SODIUM CHLORIDE 3 MILLILITER(S): 9 INJECTION INTRAMUSCULAR; INTRAVENOUS; SUBCUTANEOUS at 13:09

## 2022-05-01 NOTE — PROVIDER CONTACT NOTE (OTHER) - SITUATION
Upon initial assesement; expiratory wheezes noted; patient states breathing " not too good" however does not appear in distress; O2 sat 99%

## 2022-05-01 NOTE — PROGRESS NOTE ADULT - SUBJECTIVE AND OBJECTIVE BOX
Patient discussed on morning rounds with Dr. Romeo and Dr. Cavanaugh    s/p TEVAR small endoleak, medically manage     SUBJECTIVE:   Pt is feeling well, no complaints. States the tenderness on his right arm.     Vital Signs Last 24 Hrs  T(C): 36.9 (01 May 2022 17:26), Max: 36.9 (2022 21:31)  T(F): 98.4 (01 May 2022 17:26), Max: 98.5 (2022 21:31)  HR: 83 (01 May 2022 16:43) (71 - 96)  BP: 106/71 (01 May 2022 16:43) (90/55 - 106/71)  BP(mean): 83 (01 May 2022 16:43) (67 - 83)  RR: 20 (01 May 2022 16:43) (16 - 20)  SpO2: 96% (01 May 2022 16:43) (94% - 100%)  I&O's Detail    2022 07:01  -  01 May 2022 07:00  --------------------------------------------------------  IN:    IV PiggyBack: 350 mL    Oral Fluid: 236 mL    sodium chloride 0.9%: 1100 mL  Total IN: 1686 mL    OUT:    Voided (mL): 2425 mL  Total OUT: 2425 mL    Total NET: -739 mL      01 May 2022 07:01  -  01 May 2022 18:55  --------------------------------------------------------  IN:    Oral Fluid: 300 mL    sodium chloride 0.9%: 200 mL  Total IN: 500 mL    OUT:    Voided (mL): 1225 mL  Total OUT: 1225 mL    Total NET: -725 mL          CHEST TUBE:  Yes/No. AIR LEAKS: Yes/No. Suction / H2O SEAL.   MATTHEW DRAIN:  Yes/No.  EPICARDIAL WIRES: Yes/No.  TIE DOWNS: Yes/No.  GUAJARDO: Yes/No.    PHYSICAL EXAM:    General:     Neurological:    Cardiovascular:    Respiratory:    Gastrointestinal:    Extremities:    Vascular:    Incision Sites:    LABS:                        9.5    4.86  )-----------( 113      ( 01 May 2022 08:12 )             28.0       COUMADIN:  Yes/No. REASON: .    PT/INR - ( 2022 05:56 )   PT: 12.9 sec;   INR: 1.08          PTT - ( 2022 05:56 )  PTT:29.4 sec        138  |  109<H>  |  33<H>  ----------------------------<  97  5.0   |  21<L>  |  1.71<H>    Ca    8.9      01 May 2022 08:12  Mg     1.8             Urinalysis Basic - ( 2022 13:42 )    Color: Yellow / Appearance: Clear / S.020 / pH: x  Gluc: x / Ketone: NEGATIVE  / Bili: Negative / Urobili: 0.2 E.U./dL   Blood: x / Protein: 30 mg/dL / Nitrite: NEGATIVE   Leuk Esterase: Small / RBC: > 10 /HPF / WBC 5-10 /HPF   Sq Epi: x / Non Sq Epi: 0-5 /HPF / Bacteria: None /HPF        MEDICATIONS  (STANDING):  famotidine    Tablet 20 milliGRAM(s) Oral two times a day  heparin   Injectable 5000 Unit(s) SubCutaneous every 8 hours  labetalol 100 milliGRAM(s) Oral every 8 hours  piperacillin/tazobactam IVPB.. 2.25 Gram(s) IV Intermittent every 6 hours  senna 2 Tablet(s) Oral at bedtime  simvastatin 40 milliGRAM(s) Oral at bedtime  sodium chloride 0.9% lock flush 3 milliLiter(s) IV Push every 8 hours  tamsulosin 0.4 milliGRAM(s) Oral at bedtime    MEDICATIONS  (PRN):  albuterol/ipratropium for Nebulization 3 milliLiter(s) Nebulizer every 6 hours PRN Shortness of Breath and/or Wheezing        RADIOLOGY & ADDITIONAL TESTS:     Patient discussed on morning rounds with Dr. Romeo and Dr. Afshan Meek of [facility] in 2015  readmission with endoleak     SUBJECTIVE:   Pt is feeling well, no complaints. States the tenderness on his right arm is the same and has not gotten better or worse. Denies any CP, palpitations, SOB, wheezing, abd pain, n/v/d/c ,fevers or chills.    Vital Signs Last 24 Hrs  T(C): 36.9 (01 May 2022 17:26), Max: 36.9 (2022 21:31)  T(F): 98.4 (01 May 2022 17:26), Max: 98.5 (2022 21:31)  HR: 83 (01 May 2022 16:43) (71 - 96)  BP: 106/71 (01 May 2022 16:43) (90/55 - 106/71)  BP(mean): 83 (01 May 2022 16:43) (67 - 83)  RR: 20 (01 May 2022 16:43) (16 - 20)  SpO2: 96% (01 May 2022 16:43) (94% - 100%)  I&O's Detail    2022 07:01  -  01 May 2022 07:00  --------------------------------------------------------  IN:    IV PiggyBack: 350 mL    Oral Fluid: 236 mL    sodium chloride 0.9%: 1100 mL  Total IN: 1686 mL    OUT:    Voided (mL): 2425 mL  Total OUT: 2425 mL    Total NET: -739 mL      01 May 2022 07:01  -  01 May 2022 18:55  --------------------------------------------------------  IN:    Oral Fluid: 300 mL    sodium chloride 0.9%: 200 mL  Total IN: 500 mL    OUT:    Voided (mL): 1225 mL  Total OUT: 1225 mL    Total NET: -725 mL    CHEST TUBE:  no  MATTHEW DRAIN:  no  EPICARDIAL WIRES: no  TIE DOWNS: no  GUAJARDO: no    PHYSICAL EXAM:  General: well appearing sitting up in chair in NAD   HEENT: normocephalic, atraumatic, PERRL   Neurological: AOx3. Motor skills grossly intact  Cardiovascular: Normal S1/S2. Regular rate/rhythm. + systolic murmur   Respiratory: Lungs CTA bilaterally. No wheezing or rales  Gastrointestinal: +BS in all 4 quadrants. Non-distended. Soft. Non-tender  Extremities: Strength 5/5 b/l upper/lower extremities. Sensation grossly intact upper/lower extremities. No edema. No calf tenderness. Tender posterior RUE arm swelling (triceps area),very warm to touch  Vascular: Radial 2+bilaterally, DP 2+ b/l  Incision Sites: none     LABS:                        9.5    4.86  )-----------( 113      ( 01 May 2022 08:12 )             28.0       COUMADIN:  no    PT/INR - ( 2022 05:56 )   PT: 12.9 sec;   INR: 1.08     PTT - ( 2022 05:56 )  PTT:29.4 sec    05-    138  |  109<H>  |  33<H>  ----------------------------<  97  5.0   |  21<L>  |  1.71<H>    Ca    8.9      01 May 2022 08:12  Mg     1.8         Urinalysis Basic - ( 2022 13:42 )    Color: Yellow / Appearance: Clear / S.020 / pH: x  Gluc: x / Ketone: NEGATIVE  / Bili: Negative / Urobili: 0.2 E.U./dL   Blood: x / Protein: 30 mg/dL / Nitrite: NEGATIVE   Leuk Esterase: Small / RBC: > 10 /HPF / WBC 5-10 /HPF   Sq Epi: x / Non Sq Epi: 0-5 /HPF / Bacteria: None /HPF    MEDICATIONS  (STANDING):  famotidine    Tablet 20 milliGRAM(s) Oral two times a day  heparin   Injectable 5000 Unit(s) SubCutaneous every 8 hours  labetalol 100 milliGRAM(s) Oral every 8 hours  piperacillin/tazobactam IVPB.. 2.25 Gram(s) IV Intermittent every 6 hours  senna 2 Tablet(s) Oral at bedtime  simvastatin 40 milliGRAM(s) Oral at bedtime  sodium chloride 0.9% lock flush 3 milliLiter(s) IV Push every 8 hours  tamsulosin 0.4 milliGRAM(s) Oral at bedtime    MEDICATIONS  (PRN):  albuterol/ipratropium for Nebulization 3 milliLiter(s) Nebulizer every 6 hours PRN Shortness of Breath and/or Wheezing    RADIOLOGY & ADDITIONAL TESTS:  < from: Xray Chest 1 View- PORTABLE-Routine (Xray Chest 1 View- PORTABLE-Routine .) (22 @ 07:05) >  IMPRESSION: Widened mediastinum. Dilated aortic knob.  < end of copied text >

## 2022-05-01 NOTE — PROGRESS NOTE ADULT - ASSESSMENT
71 y/o M poor historian who lives in a nursing home with PMHx type B aortic dissection 2015, s/p TEVAR , HTN, asthma, BPH who presented to [facility] with new onset bruising left upper epigastric area, just under rib cage associated w/ pain to touch for 5 days prior to admission. Subsequent CTA chest showed minimal extravasation of contrast/endovascular leak, normal post-operative changes suggestive of reimplantation of great vessels. Patient was transferred to [facility] for evaluation with Dr. Marie. Of note, Incidental UTI noted, Bactrim started causing YANIRA. Creatinine trending down now, continue gentle IVF. No surgical intervention needed at this time, patient pending discharge to nursing facility. Keeping in house for IV abx for coverage for UTI. In the evening on 4/30 pt was noted to have pain in RUE (triceps area of arm), warm to touch, tenderness, and small induration vs. hematoma. Started Vancomycin but was stopped in AM on 5/1. Duplex RUE completed negative for DVT but does show complex fluid collection, likely abscess. Due to concerns of possible abscess restarted Vancomycin in evening (4th dose will be 5/3am). Consider I&D drainage.     Plan:  Neurovascular:   -stable, no active issues     Cardiovascular:   -hx of TEVAR, readmission with small endoleak    -medical mgt with BP control only    -continue with labetalol 100mg Q8 hours (decreased today due to hypotension)   -hypotension: likely 2/2 to too many BP medications    -if persists in AM, TTE limited to r/o effusion ordered   -HLD: continue with statin therapy   -Hemodynamically stable.   -Monitor: BP, HR, tele    Respiratory:   -hx of Asthma, stable at this time: prn duonebs as needed   -Oxygenating well on room air  -Encourage continued use of IS 10x/hr and frequent ambulation  -CXR: no acute pathology, no PTX     GI:  -GI PPX: famotidine   -PO Diet: Regular   -Bowel Regimen: senna     Renal / :  -hx of BPH: started tamsulosin this admission, helping with hesistancy symptom   -UTI this admission  -YANIRA this admission 2/2 Bactrim - resolved   -Continue to monitor renal function: BUN/Cr 33/1.71  -Monitor I/O's daily     Endocrine:    -No hx of DM or thyroid dx  -A1c: 5.6   -TSH: 2.04     Hematologic:  -CBC: H/H- 9.5/28  -Coagulation Panel : WNL     ID:  -UTI     -UCx growing E. Coli    -continue with ceftriaxone 1000mg IV Q24 hours for at least one more day    -pending repeat UA/UCx to ensure proper Abx regimen established before discharge     -previously was on Bactrim but developed YANIRA, was switched to cipro but sensitivity then showed resistance, was switched to ceftriaxone and now for better coverage on Zosyn     -first day of Abx from 4/28 for UTI coverage   -Soft tissue infection/abscess RUE: started Vancomycin x1 dose yesterday, restarted 1000mg Q12 hours this evening (vanco trough due in AM on 5/3), consider possible I&D drainage if appears to be true abscess    -Duplex RUE negative for negative but positive for complex fluid collection hematoma vs. abscess   -Temperature: afebrile   -CBC: WBC- 4.8  -Continue to observe for SIRS/Sepsis Syndrome. Clinically stable at this time,     Prophylaxis:  -DVT prophylaxis with 5000 SubQ Heparin q8h.  -Continue with SCD's b/l while patient is at rest     Disposition:  -Discharge home once patient is medically ready

## 2022-05-02 LAB
ANION GAP SERPL CALC-SCNC: 12 MMOL/L — SIGNIFICANT CHANGE UP (ref 5–17)
BUN SERPL-MCNC: 33 MG/DL — HIGH (ref 7–23)
CALCIUM SERPL-MCNC: 9.3 MG/DL — SIGNIFICANT CHANGE UP (ref 8.4–10.5)
CHLORIDE SERPL-SCNC: 109 MMOL/L — HIGH (ref 96–108)
CO2 SERPL-SCNC: 21 MMOL/L — LOW (ref 22–31)
CREAT SERPL-MCNC: 1.59 MG/DL — HIGH (ref 0.5–1.3)
EGFR: 46 ML/MIN/1.73M2 — LOW
GLUCOSE SERPL-MCNC: 95 MG/DL — SIGNIFICANT CHANGE UP (ref 70–99)
HCT VFR BLD CALC: 30.1 % — LOW (ref 39–50)
HGB BLD-MCNC: 10.2 G/DL — LOW (ref 13–17)
MAGNESIUM SERPL-MCNC: 1.7 MG/DL — SIGNIFICANT CHANGE UP (ref 1.6–2.6)
MCHC RBC-ENTMCNC: 26.2 PG — LOW (ref 27–34)
MCHC RBC-ENTMCNC: 33.9 GM/DL — SIGNIFICANT CHANGE UP (ref 32–36)
MCV RBC AUTO: 77.2 FL — LOW (ref 80–100)
NRBC # BLD: 0 /100 WBCS — SIGNIFICANT CHANGE UP (ref 0–0)
PLATELET # BLD AUTO: 129 K/UL — LOW (ref 150–400)
POTASSIUM SERPL-MCNC: 4.8 MMOL/L — SIGNIFICANT CHANGE UP (ref 3.5–5.3)
POTASSIUM SERPL-SCNC: 4.8 MMOL/L — SIGNIFICANT CHANGE UP (ref 3.5–5.3)
RBC # BLD: 3.9 M/UL — LOW (ref 4.2–5.8)
RBC # FLD: 15.5 % — HIGH (ref 10.3–14.5)
SODIUM SERPL-SCNC: 142 MMOL/L — SIGNIFICANT CHANGE UP (ref 135–145)
WBC # BLD: 5.05 K/UL — SIGNIFICANT CHANGE UP (ref 3.8–10.5)
WBC # FLD AUTO: 5.05 K/UL — SIGNIFICANT CHANGE UP (ref 3.8–10.5)

## 2022-05-02 PROCEDURE — 93306 TTE W/DOPPLER COMPLETE: CPT | Mod: 26

## 2022-05-02 PROCEDURE — 73060 X-RAY EXAM OF HUMERUS: CPT | Mod: 26,RT

## 2022-05-02 RX ORDER — MAGNESIUM OXIDE 400 MG ORAL TABLET 241.3 MG
400 TABLET ORAL ONCE
Refills: 0 | Status: COMPLETED | OUTPATIENT
Start: 2022-05-02 | End: 2022-05-02

## 2022-05-02 RX ORDER — SODIUM CHLORIDE 9 MG/ML
250 INJECTION INTRAMUSCULAR; INTRAVENOUS; SUBCUTANEOUS ONCE
Refills: 0 | Status: COMPLETED | OUTPATIENT
Start: 2022-05-02 | End: 2022-05-02

## 2022-05-02 RX ORDER — SODIUM CHLORIDE 9 MG/ML
500 INJECTION INTRAMUSCULAR; INTRAVENOUS; SUBCUTANEOUS ONCE
Refills: 0 | Status: DISCONTINUED | OUTPATIENT
Start: 2022-05-02 | End: 2022-05-02

## 2022-05-02 RX ORDER — SODIUM CHLORIDE 9 MG/ML
1000 INJECTION INTRAMUSCULAR; INTRAVENOUS; SUBCUTANEOUS
Refills: 0 | Status: DISCONTINUED | OUTPATIENT
Start: 2022-05-02 | End: 2022-05-03

## 2022-05-02 RX ADMIN — SODIUM CHLORIDE 50 MILLILITER(S): 9 INJECTION INTRAMUSCULAR; INTRAVENOUS; SUBCUTANEOUS at 14:58

## 2022-05-02 RX ADMIN — HEPARIN SODIUM 5000 UNIT(S): 5000 INJECTION INTRAVENOUS; SUBCUTANEOUS at 21:42

## 2022-05-02 RX ADMIN — HEPARIN SODIUM 5000 UNIT(S): 5000 INJECTION INTRAVENOUS; SUBCUTANEOUS at 14:45

## 2022-05-02 RX ADMIN — MAGNESIUM OXIDE 400 MG ORAL TABLET 400 MILLIGRAM(S): 241.3 TABLET ORAL at 08:36

## 2022-05-02 RX ADMIN — SODIUM CHLORIDE 3 MILLILITER(S): 9 INJECTION INTRAMUSCULAR; INTRAVENOUS; SUBCUTANEOUS at 05:34

## 2022-05-02 RX ADMIN — SODIUM CHLORIDE 3 MILLILITER(S): 9 INJECTION INTRAMUSCULAR; INTRAVENOUS; SUBCUTANEOUS at 14:43

## 2022-05-02 RX ADMIN — Medication 3 MILLILITER(S): at 05:52

## 2022-05-02 RX ADMIN — PIPERACILLIN AND TAZOBACTAM 200 GRAM(S): 4; .5 INJECTION, POWDER, LYOPHILIZED, FOR SOLUTION INTRAVENOUS at 14:45

## 2022-05-02 RX ADMIN — TAMSULOSIN HYDROCHLORIDE 0.4 MILLIGRAM(S): 0.4 CAPSULE ORAL at 21:43

## 2022-05-02 RX ADMIN — PIPERACILLIN AND TAZOBACTAM 200 GRAM(S): 4; .5 INJECTION, POWDER, LYOPHILIZED, FOR SOLUTION INTRAVENOUS at 06:51

## 2022-05-02 RX ADMIN — SENNA PLUS 2 TABLET(S): 8.6 TABLET ORAL at 21:43

## 2022-05-02 RX ADMIN — Medication 250 MILLIGRAM(S): at 05:46

## 2022-05-02 RX ADMIN — SIMVASTATIN 40 MILLIGRAM(S): 20 TABLET, FILM COATED ORAL at 21:43

## 2022-05-02 RX ADMIN — SODIUM CHLORIDE 3 MILLILITER(S): 9 INJECTION INTRAMUSCULAR; INTRAVENOUS; SUBCUTANEOUS at 21:43

## 2022-05-02 RX ADMIN — PIPERACILLIN AND TAZOBACTAM 200 GRAM(S): 4; .5 INJECTION, POWDER, LYOPHILIZED, FOR SOLUTION INTRAVENOUS at 00:15

## 2022-05-02 RX ADMIN — FAMOTIDINE 20 MILLIGRAM(S): 10 INJECTION INTRAVENOUS at 05:46

## 2022-05-02 RX ADMIN — SODIUM CHLORIDE 250 MILLILITER(S): 9 INJECTION INTRAMUSCULAR; INTRAVENOUS; SUBCUTANEOUS at 14:57

## 2022-05-02 RX ADMIN — HEPARIN SODIUM 5000 UNIT(S): 5000 INJECTION INTRAVENOUS; SUBCUTANEOUS at 05:45

## 2022-05-02 RX ADMIN — Medication 250 MILLIGRAM(S): at 18:50

## 2022-05-02 RX ADMIN — FAMOTIDINE 20 MILLIGRAM(S): 10 INJECTION INTRAVENOUS at 18:20

## 2022-05-02 RX ADMIN — PIPERACILLIN AND TAZOBACTAM 200 GRAM(S): 4; .5 INJECTION, POWDER, LYOPHILIZED, FOR SOLUTION INTRAVENOUS at 18:20

## 2022-05-02 RX ADMIN — Medication 100 MILLIGRAM(S): at 05:46

## 2022-05-02 NOTE — CONSULT NOTE ADULT - ASSESSMENT
73yo male s/p TEVAR in 2015 admitted s/p endoleak with YANIRA, and UTI. During hospital admission developed RUE swelling associated with pain. No fevers, chills, systemic signs of infection. Pain improving. On exam, afebrile and HDS, tender, non fluctuant mass in RUE, mobile and tender to palpation. Ultrasound imaging demonstrating "1.4 x 0.9 x 1.0 cm complex cystic, avascular collection in the subcutaneous fat of the posterior right upper arm" WBC normal, creatinine downtrending. Receiving Zosyn and Vanc.     RECOMMENDATIONS  - given previous injection, lack of fluctuance and lack of overlying cellulitis favor hematoma vs abscess  - no surgical intervention at this time  - recommend warm compresses  - Team 4C surgery to sign off, please re consult as necessary    Seen with Attending Dr. Champion and SHANTEL   71yo male s/p TEVAR in 2015 admitted s/p endoleak with YANIRA, and UTI. During hospital admission developed RUE swelling associated with pain. No fevers, chills, systemic signs of infection. Pain improving. On exam, afebrile and HDS, tender, non fluctuant mass in RUE, mobile and tender to palpation. Ultrasound imaging demonstrating "1.4 x 0.9 x 1.0 cm complex cystic, avascular collection in the subcutaneous fat of the posterior right upper arm" WBC normal, creatinine downtrending. Receiving Zosyn and Vanc.     RECOMMENDATIONS  - given previous injection, lack of fluctuance and lack of overlying cellulitis favor hematoma vs abscess  - no surgical intervention at this time  - recommend warm compresses  - Team 4C surgery to sign off, please re consult as necessary    Seen with Attending Dr. Champion and Chief Resident    Senior Resident Addendum:  Patient seen and examined with consult resident and attending physician Dr. Champion.  Likely hematoma as described above.  recommend warm compresses.   no surgical intervention at this time

## 2022-05-02 NOTE — PROGRESS NOTE ADULT - SUBJECTIVE AND OBJECTIVE BOX
Patient discussed on morning rounds with Dr. Marie    Operation / Date: s/p TEVAR in 2015 admitted for r/o endoleak (small endoleak on CTA)    SUBJECTIVE ASSESSMENT:  72y Male seen and examined. Patient feels well and has no complaints.  Tolerating PO diet, satting well on room air, ambulating with assistance. Denies lighteadedness, headache, CP, palpitations, SOB, abdominal pain, nausea, vomiting, fever, chills.      Vital Signs Last 24 Hrs  T(C): 36.6 (02 May 2022 09:02), Max: 36.9 (01 May 2022 17:26)  T(F): 97.9 (02 May 2022 09:02), Max: 98.4 (01 May 2022 17:26)  HR: 74 (02 May 2022 10:06) (73 - 96)  BP: 101/70 (02 May 2022 10:06) (91/60 - 106/71)  BP(mean): 82 (02 May 2022 10:06) (71 - 83)  RR: 14 (02 May 2022 10:06) (13 - 20)  SpO2: 100% (02 May 2022 10:06) (92% - 100%)  I&O's Detail    01 May 2022 07:01  -  02 May 2022 07:00  --------------------------------------------------------  IN:    IV PiggyBack: 450 mL    Oral Fluid: 300 mL    sodium chloride 0.9%: 200 mL  Total IN: 950 mL    OUT:    Voided (mL): 2125 mL  Total OUT: 2125 mL    Total NET: -1175 mL      02 May 2022 07:01  -  02 May 2022 11:37  --------------------------------------------------------  IN:  Total IN: 0 mL    OUT:    Voided (mL): 600 mL  Total OUT: 600 mL    Total NET: -600 mL      CHEST TUBE:  No  MATTHEW DRAIN:  No.  EPICARDIAL WIRES: No.  TIE DOWNS: No.  GUAJARDO: No.    PHYSICAL EXAM:    General: NAD, sitting comfortably in bed, conversing appropriately  Neurological: alert and oriented, UE and LE strength equal b/l, facial symmetry present  Cardiovascular: RRR, Clear S1 and S2, no murmurs appreciated  Respiratory: chest expansion symmetrical, CTA b/l, no wheezing noted  Gastrointestinal: +BS, soft, NT, ND, ecchymosis to L anterior chest and LUQ to left flank, no erythema or surrounding warmth.   Extremities: moving spontaneously, no calf tenderness or edema. 1cm x1cm collection to posterior of R arm, above triceps. No erythema noted, warmth present. TTP  Vascular: warm, well perfused. DP/PT pulses palpable b/l.  Incisions: none      LABS:                        10.2   5.05  )-----------( 129      ( 02 May 2022 05:59 )             30.1       COUMADIN: No        -    142  |  109<H>  |  33<H>  ----------------------------<  95  4.8   |  21<L>  |  1.59<H>    Ca    9.3      02 May 2022 05:58  Mg     1.7     05-        Urinalysis Basic - ( 2022 13:42 )    Color: Yellow / Appearance: Clear / S.020 / pH: x  Gluc: x / Ketone: NEGATIVE  / Bili: Negative / Urobili: 0.2 E.U./dL   Blood: x / Protein: 30 mg/dL / Nitrite: NEGATIVE   Leuk Esterase: Small / RBC: > 10 /HPF / WBC 5-10 /HPF   Sq Epi: x / Non Sq Epi: 0-5 /HPF / Bacteria: None /HPF        MEDICATIONS  (STANDING):  famotidine    Tablet 20 milliGRAM(s) Oral two times a day  heparin   Injectable 5000 Unit(s) SubCutaneous every 8 hours  labetalol 100 milliGRAM(s) Oral every 8 hours  piperacillin/tazobactam IVPB.. 2.25 Gram(s) IV Intermittent every 6 hours  senna 2 Tablet(s) Oral at bedtime  simvastatin 40 milliGRAM(s) Oral at bedtime  sodium chloride 0.9% lock flush 3 milliLiter(s) IV Push every 8 hours  tamsulosin 0.4 milliGRAM(s) Oral at bedtime  vancomycin  IVPB      vancomycin  IVPB 1000 milliGRAM(s) IV Intermittent every 12 hours    MEDICATIONS  (PRN):  albuterol/ipratropium for Nebulization 3 milliLiter(s) Nebulizer every 6 hours PRN Shortness of Breath and/or Wheezing        RADIOLOGY & ADDITIONAL TESTS:    < from: US Duplex Venous Upper Ext Ltd, Right (22 @ 11:37) >  IMPRESSION:  1. No deep venous thrombosis.  2. There is a 1.4 cm complex avascular collection in the subcutaneous fat   of the right upper arm. Differential includes hematoma and abscess.   Recommend clinical correlation.    < end of copied text >

## 2022-05-02 NOTE — CONSULT NOTE ADULT - ATTENDING COMMENTS
Pt was seen and examined.  Small collection on postero-lateral aspect of the right shoulder.  No tenderness on exam. Collection is mobile, well organized.  No skin changes over the collection.  US is reviewed.  Most likely represents organized hematoma due to medication injection.  Recommend warm compresses.  No drainage is necessary for now.

## 2022-05-02 NOTE — CONSULT NOTE ADULT - SUBJECTIVE AND OBJECTIVE BOX
Surgery Consult      Consulting surgical team: [ ] 1 - colorectal/surg onc   [ ] 2 - MIS/bariatrics   [x ] 4 - acute care   [ ] 5 - general surgery/breast/pediatrics  Consulting attending: Dr. ROBERTS:  71 y/o male poor historian lies in a nursing home PMH type B aortic dissection 2015, s/p TEVAR , HTN, asthma, BPH who presented to [facility] with an epigastric hematoma. Denies pain or traumatic fall, not on anticoagulants. No other significant symptoms. Subsequent CTA chest showed minimal extravasation of contrast/endovascular leak, normal post-operative changes suggestive of reimplantation of great vessels. Transferred to [facility] under Dr. Marie for evaluation and management. (27 Apr 2022 04:57)      General surgery consulted for RUE soft tissue mass. Started during this inpatient visit, tender at rest and to palpation. Patient states reducing in size. Site is at location of previous SQH injection site.        PAST MEDICAL HISTORY:  H/O aortic dissection    HTN (hypertension)    BPH (benign prostatic hyperplasia)        PAST SURGICAL HISTORY:  S/P insertion of endovascular thoracic aortic stent graft        MEDICATIONS:      ALLERGIES:  No Known Allergies      SOCHX:   Social History:  lives in nursing home/uses walker  non smoker (27 Apr 2022 04:57)      FAMHX:   FAMILY HISTORY:        Vitals:  Vital Signs Last 24 Hrs  T(C): 36.9 (02 May 2022 14:10), Max: 36.9 (01 May 2022 17:26)  T(F): 98.4 (02 May 2022 14:10), Max: 98.4 (01 May 2022 17:26)  HR: 82 (02 May 2022 16:14) (73 - 88)  BP: 103/69 (02 May 2022 16:14) (91/60 - 113/72)  BP(mean): 81 (02 May 2022 16:14) (71 - 88)  RR: 16 (02 May 2022 16:14) (13 - 20)  SpO2: 98% (02 May 2022 16:14) (92% - 100%)      PHYSICAL EXAM:  Physical Exam  General: NAD, resting comfortably in bed  Pulm: Nonlabored breathing, no respiratory distress  Abd: soft, non distended, incisions c/d/i, non tender  Extrem: WWP, no edema, RUE soft tissue mass R tricep area ~1cm, firm, non fluctuant, no skin changes or erythema. 2+ radial pulse, full ROM, 5/5 upper extremity strength testing  Neuro: A/O x 3, CNs II-XII grossly intact, no focal deficits, normal sensation  Pulses: palpable distal pulses     I&o's:  I&O's Summary    01 May 2022 07:01  -  02 May 2022 07:00  --------------------------------------------------------  IN: 950 mL / OUT: 2125 mL / NET: -1175 mL    02 May 2022 07:01  -  02 May 2022 16:16  --------------------------------------------------------  IN: 0 mL / OUT: 1350 mL / NET: -1350 mL        LABS:                        10.2   5.05  )-----------( 129      ( 02 May 2022 05:59 )             30.1     05-02    142  |  109<H>  |  33<H>  ----------------------------<  95  4.8   |  21<L>  |  1.59<H>    Ca    9.3      02 May 2022 05:58  Mg     1.7     05-02

## 2022-05-02 NOTE — PROGRESS NOTE ADULT - ASSESSMENT
71 y/o M poor historian who lives in a nursing home with PMHx type B aortic dissection 2015, s/p TEVAR , HTN, asthma, BPH who presented to [facility] with new onset bruising left upper epigastric area, just under rib cage associated w/ pain to touch for 5 days prior to admission. Subsequent CTA chest showed minimal extravasation of contrast/endovascular leak, normal post-operative changes suggestive of reimplantation of great vessels. Patient was transferred to [facility] for evaluation with Dr. Marie. Of note, Incidental UTI noted, Bactrim started causing YANIRA. Creatinine trending down now, continue gentle IVF. No surgical intervention needed at this time, patient pending discharge to nursing facility. Keeping in house for IV abx for coverage for UTI. In the evening on 4/30 pt was noted to have pain in RUE (triceps area of arm), warm to touch, tenderness, and small induration vs. hematoma. Started Vancomycin but was stopped in AM on 5/1. Duplex RUE completed negative for DVT but does show complex fluid collection, hematoma vs abscess. Due to concerns of possible abscess restarted Vancomycin in evening (4th dose will be 5/3am). General surgery consulted for possible I&D.    Plan:    Neurovascular:   -no current pain    Cardiovascular:   s/p TEVAR in 2015 and readmission with new small endoleak on CTA  -medical management  -continue labetolol 100 (lowered yesterday for hypotension)  -BP still soft, pending echo read to r/o pericardial effusion  HLD  -continue simvastatin  -Hemodynamically stable.   -Monitor: BP, HR, tele    Respiratory:   -Oxygenating well on room air  -Encourage continued use of IS 10x/hr and frequent ambulation  hx asthma  -continue duonebs PRN    GI:  -GI PPX: continue famotidine  -PO Diet  -Bowel Regimen: continue senna    Renal / :  YANIRA 2/2 bactrim for UTI  -Continue to monitor renal function: BUN/Cr: 33/1.59  -Creatinine downtrending  -Monitor I/O's daily   BPH  -continue tamsulosin    Endocrine:    -No hx of DM or thyroid dx  -A1c: 5.6  -TSH: 2.040    Hematologic:  -CBC: H/H- 10.2/30.1  -Coagulation Panel in AM  -no signs or symptoms of overt bleeding    ID:  -Temperature: afebrile  -CBC: WBC-5.05  -Continue to observe for SIRS/Sepsis Syndrome.  Incidental UTI   -UCx growing E. Coli  -repeat UA/UCx had small leukocytes and no growth on culture  -previously was on Bactrim but developed YANIRA, was switched to cipro but sensitivity then showed resistance, was switched to ceftriaxone and now for better coverage on Zosyn   -first day of Abx from 4/28 for UTI coverage   Soft tissue infection/abscess RUE  -started Vancomycin x1 dose yesterday, restarted 1000mg Q12 hours this evening (vanco trough due in AM on 5/3)  -gen surgery consulted for possible abscess and the need for I&D  -Duplex RUE negative for negative but positive for complex fluid collection hematoma vs. abscess     Prophylaxis:  -DVT prophylaxis with 5000 SubQ Heparin q8h.  -Continue with SCD's b/l while patient is at rest     Disposition:  -Discharge back to facility once patient is medically ready

## 2022-05-03 ENCOUNTER — TRANSCRIPTION ENCOUNTER (OUTPATIENT)
Age: 72
End: 2022-05-03

## 2022-05-03 VITALS
DIASTOLIC BLOOD PRESSURE: 62 MMHG | SYSTOLIC BLOOD PRESSURE: 98 MMHG | HEART RATE: 88 BPM | RESPIRATION RATE: 20 BRPM | OXYGEN SATURATION: 97 %

## 2022-05-03 LAB
ANION GAP SERPL CALC-SCNC: 10 MMOL/L — SIGNIFICANT CHANGE UP (ref 5–17)
APTT BLD: 31.6 SEC — SIGNIFICANT CHANGE UP (ref 27.5–35.5)
BUN SERPL-MCNC: 25 MG/DL — HIGH (ref 7–23)
CALCIUM SERPL-MCNC: 9.2 MG/DL — SIGNIFICANT CHANGE UP (ref 8.4–10.5)
CHLORIDE SERPL-SCNC: 106 MMOL/L — SIGNIFICANT CHANGE UP (ref 96–108)
CO2 SERPL-SCNC: 23 MMOL/L — SIGNIFICANT CHANGE UP (ref 22–31)
CREAT SERPL-MCNC: 1.38 MG/DL — HIGH (ref 0.5–1.3)
EGFR: 55 ML/MIN/1.73M2 — LOW
GLUCOSE SERPL-MCNC: 91 MG/DL — SIGNIFICANT CHANGE UP (ref 70–99)
HCT VFR BLD CALC: 29.9 % — LOW (ref 39–50)
HGB BLD-MCNC: 10.4 G/DL — LOW (ref 13–17)
INR BLD: 1.14 — SIGNIFICANT CHANGE UP (ref 0.88–1.16)
MAGNESIUM SERPL-MCNC: 1.7 MG/DL — SIGNIFICANT CHANGE UP (ref 1.6–2.6)
MCHC RBC-ENTMCNC: 26.6 PG — LOW (ref 27–34)
MCHC RBC-ENTMCNC: 34.8 GM/DL — SIGNIFICANT CHANGE UP (ref 32–36)
MCV RBC AUTO: 76.5 FL — LOW (ref 80–100)
NRBC # BLD: 0 /100 WBCS — SIGNIFICANT CHANGE UP (ref 0–0)
PLATELET # BLD AUTO: 112 K/UL — LOW (ref 150–400)
POTASSIUM SERPL-MCNC: 4.6 MMOL/L — SIGNIFICANT CHANGE UP (ref 3.5–5.3)
POTASSIUM SERPL-SCNC: 4.6 MMOL/L — SIGNIFICANT CHANGE UP (ref 3.5–5.3)
PROTHROM AB SERPL-ACNC: 13.6 SEC — HIGH (ref 10.5–13.4)
RBC # BLD: 3.91 M/UL — LOW (ref 4.2–5.8)
RBC # FLD: 15.6 % — HIGH (ref 10.3–14.5)
SARS-COV-2 RNA SPEC QL NAA+PROBE: SIGNIFICANT CHANGE UP
SODIUM SERPL-SCNC: 139 MMOL/L — SIGNIFICANT CHANGE UP (ref 135–145)
VANCOMYCIN TROUGH SERPL-MCNC: 23.3 UG/ML — HIGH (ref 10–20)
WBC # BLD: 5.37 K/UL — SIGNIFICANT CHANGE UP (ref 3.8–10.5)
WBC # FLD AUTO: 5.37 K/UL — SIGNIFICANT CHANGE UP (ref 3.8–10.5)

## 2022-05-03 PROCEDURE — 80053 COMPREHEN METABOLIC PANEL: CPT

## 2022-05-03 PROCEDURE — 36415 COLL VENOUS BLD VENIPUNCTURE: CPT

## 2022-05-03 PROCEDURE — 85025 COMPLETE CBC W/AUTO DIFF WBC: CPT

## 2022-05-03 PROCEDURE — 87086 URINE CULTURE/COLONY COUNT: CPT

## 2022-05-03 PROCEDURE — U0003: CPT

## 2022-05-03 PROCEDURE — 83036 HEMOGLOBIN GLYCOSYLATED A1C: CPT

## 2022-05-03 PROCEDURE — 84484 ASSAY OF TROPONIN QUANT: CPT

## 2022-05-03 PROCEDURE — 84443 ASSAY THYROID STIM HORMONE: CPT

## 2022-05-03 PROCEDURE — 80048 BASIC METABOLIC PNL TOTAL CA: CPT

## 2022-05-03 PROCEDURE — 86901 BLOOD TYPING SEROLOGIC RH(D): CPT

## 2022-05-03 PROCEDURE — 85730 THROMBOPLASTIN TIME PARTIAL: CPT

## 2022-05-03 PROCEDURE — 94640 AIRWAY INHALATION TREATMENT: CPT

## 2022-05-03 PROCEDURE — 80061 LIPID PANEL: CPT

## 2022-05-03 PROCEDURE — 84436 ASSAY OF TOTAL THYROXINE: CPT

## 2022-05-03 PROCEDURE — 81001 URINALYSIS AUTO W/SCOPE: CPT

## 2022-05-03 PROCEDURE — 97161 PT EVAL LOW COMPLEX 20 MIN: CPT

## 2022-05-03 PROCEDURE — U0005: CPT

## 2022-05-03 PROCEDURE — 99238 HOSP IP/OBS DSCHRG MGMT 30/<: CPT

## 2022-05-03 PROCEDURE — 86900 BLOOD TYPING SEROLOGIC ABO: CPT

## 2022-05-03 PROCEDURE — 73060 X-RAY EXAM OF HUMERUS: CPT

## 2022-05-03 PROCEDURE — 83735 ASSAY OF MAGNESIUM: CPT

## 2022-05-03 PROCEDURE — 86803 HEPATITIS C AB TEST: CPT

## 2022-05-03 PROCEDURE — 80202 ASSAY OF VANCOMYCIN: CPT

## 2022-05-03 PROCEDURE — 87635 SARS-COV-2 COVID-19 AMP PRB: CPT

## 2022-05-03 PROCEDURE — 85027 COMPLETE CBC AUTOMATED: CPT

## 2022-05-03 PROCEDURE — 74174 CTA ABD&PLVS W/CONTRAST: CPT

## 2022-05-03 PROCEDURE — 93306 TTE W/DOPPLER COMPLETE: CPT

## 2022-05-03 PROCEDURE — 83880 ASSAY OF NATRIURETIC PEPTIDE: CPT

## 2022-05-03 PROCEDURE — 86850 RBC ANTIBODY SCREEN: CPT

## 2022-05-03 PROCEDURE — 87186 SC STD MICRODIL/AGAR DIL: CPT

## 2022-05-03 PROCEDURE — 85610 PROTHROMBIN TIME: CPT

## 2022-05-03 PROCEDURE — 71275 CT ANGIOGRAPHY CHEST: CPT

## 2022-05-03 PROCEDURE — 71045 X-RAY EXAM CHEST 1 VIEW: CPT

## 2022-05-03 PROCEDURE — 93971 EXTREMITY STUDY: CPT

## 2022-05-03 RX ORDER — LABETALOL HCL 100 MG
50 TABLET ORAL ONCE
Refills: 0 | Status: COMPLETED | OUTPATIENT
Start: 2022-05-03 | End: 2022-05-03

## 2022-05-03 RX ORDER — TAMSULOSIN HYDROCHLORIDE 0.4 MG/1
1 CAPSULE ORAL
Qty: 30 | Refills: 0
Start: 2022-05-03 | End: 2022-06-01

## 2022-05-03 RX ORDER — LABETALOL HCL 100 MG
1 TABLET ORAL
Qty: 90 | Refills: 0
Start: 2022-05-03 | End: 2022-06-01

## 2022-05-03 RX ORDER — LABETALOL HCL 100 MG
1 TABLET ORAL
Qty: 0 | Refills: 0 | DISCHARGE
Start: 2022-05-03

## 2022-05-03 RX ORDER — CEFTRIAXONE 500 MG/1
1000 INJECTION, POWDER, FOR SOLUTION INTRAMUSCULAR; INTRAVENOUS ONCE
Refills: 0 | Status: DISCONTINUED | OUTPATIENT
Start: 2022-05-03 | End: 2022-05-03

## 2022-05-03 RX ORDER — LABETALOL HCL 100 MG
50 TABLET ORAL EVERY 8 HOURS
Refills: 0 | Status: DISCONTINUED | OUTPATIENT
Start: 2022-05-03 | End: 2022-05-03

## 2022-05-03 RX ORDER — SIMVASTATIN 20 MG/1
1 TABLET, FILM COATED ORAL
Qty: 30 | Refills: 0
Start: 2022-05-03 | End: 2022-06-01

## 2022-05-03 RX ORDER — TAMSULOSIN HYDROCHLORIDE 0.4 MG/1
1 CAPSULE ORAL
Qty: 0 | Refills: 0 | DISCHARGE
Start: 2022-05-03

## 2022-05-03 RX ORDER — LABETALOL HCL 100 MG
100 TABLET ORAL EVERY 8 HOURS
Refills: 0 | Status: DISCONTINUED | OUTPATIENT
Start: 2022-05-03 | End: 2022-05-03

## 2022-05-03 RX ADMIN — Medication 100 MILLIGRAM(S): at 13:55

## 2022-05-03 RX ADMIN — PIPERACILLIN AND TAZOBACTAM 200 GRAM(S): 4; .5 INJECTION, POWDER, LYOPHILIZED, FOR SOLUTION INTRAVENOUS at 06:28

## 2022-05-03 RX ADMIN — Medication 50 MILLIGRAM(S): at 11:57

## 2022-05-03 RX ADMIN — FAMOTIDINE 20 MILLIGRAM(S): 10 INJECTION INTRAVENOUS at 05:59

## 2022-05-03 RX ADMIN — PIPERACILLIN AND TAZOBACTAM 200 GRAM(S): 4; .5 INJECTION, POWDER, LYOPHILIZED, FOR SOLUTION INTRAVENOUS at 01:01

## 2022-05-03 RX ADMIN — HEPARIN SODIUM 5000 UNIT(S): 5000 INJECTION INTRAVENOUS; SUBCUTANEOUS at 05:59

## 2022-05-03 RX ADMIN — PIPERACILLIN AND TAZOBACTAM 200 GRAM(S): 4; .5 INJECTION, POWDER, LYOPHILIZED, FOR SOLUTION INTRAVENOUS at 12:00

## 2022-05-03 RX ADMIN — HEPARIN SODIUM 5000 UNIT(S): 5000 INJECTION INTRAVENOUS; SUBCUTANEOUS at 13:05

## 2022-05-03 RX ADMIN — SODIUM CHLORIDE 3 MILLILITER(S): 9 INJECTION INTRAMUSCULAR; INTRAVENOUS; SUBCUTANEOUS at 13:05

## 2022-05-03 RX ADMIN — SODIUM CHLORIDE 3 MILLILITER(S): 9 INJECTION INTRAMUSCULAR; INTRAVENOUS; SUBCUTANEOUS at 05:59

## 2022-05-03 NOTE — PROGRESS NOTE ADULT - SUBJECTIVE AND OBJECTIVE BOX
Patient discussed on morning rounds with       Operation / Date:     Surgeon:    Referring Physician:    SUBJECTIVE ASSESSMENT:  72y Male     HOSPITAL COURSE:  71 y/o male poor historian who lives in a nursing home with PMHx type B aortic dissection 2015, s/p TEVAR , HTN, asthma, BPH who presented to [facility] with new onset bruising left upper epigastric area, just under rib cage associated w/ pain to touch.  Came on about 5 days before.  Subsequent CTA chest showed minimal extravasation of contrast/endovascular leak, normal post-operative changes suggestive of reimplantation of great vessels. Patient was transferred to [facility] for evaluation with Dr. Marie.  Incidental UTI noted, Bactrim started causing YANIRA. Creatinine trending down now, continue gentle IVF. No surgical intervention needed at this time per Dr. Marie. Completed 7 days of IV abx and UTI course completed. RUE hematoma noted on RUE above biceps. Vanc started for possible abscess. General surgery consulted, no surgical intervention needed at this time, abx not indicated, recommend warm compresses. Labetalol decreased 2/2 borderline hypotension. Tolerating PO diet, ambulating with walker and assist, satting well on room air. Per Dr. Marie patient is medically stable for discharge back to living facility today, 5/3/22.    Vital Signs Last 24 Hrs  T(C): 36.8 (03 May 2022 14:02), Max: 37 (02 May 2022 18:26)  T(F): 98.2 (03 May 2022 14:02), Max: 98.6 (02 May 2022 18:26)  HR: 98 (03 May 2022 13:29) (82 - 104)  BP: 164/78 (03 May 2022 13:29) (90/58 - 164/78)  BP(mean): 112 (03 May 2022 13:29) (73 - 112)  RR: 20 (03 May 2022 13:29) (15 - 30)  SpO2: 99% (03 May 2022 13:29) (95% - 100%)    EPICARDIAL WIRES REMOVED: Yes/No.  TIE DOWNS REMOVED: Yes/No.    PHYSICAL EXAM:    General:     Neurological:    Cardiovascular:    Respiratory:    Gastrointestinal:    Extremities:    Vascular:    Incision Sites:    LABS:                        10.4   5.37  )-----------( 112      ( 03 May 2022 05:36 )             29.9       COUMADIN:  Yes/No.        DOSE:                  INDICATION:                GOAL INR:    PT/INR - ( 03 May 2022 05:36 )   PT: 13.6 sec;   INR: 1.14          PTT - ( 03 May 2022 05:36 )  PTT:31.6 sec    05-03    139  |  106  |  25<H>  ----------------------------<  91  4.6   |  23  |  1.38<H>    Ca    9.2      03 May 2022 05:36  Mg     1.7     05-03            Discharge CXR:    Discharge ECHO:     Patient discussed on morning rounds with Dr. Marie    Operation / Date: s/p TEVAR in 2015, readmitted to r/o endoleak (small endoleak on CTA)/YANIRA    Surgeon: Dr. Marie    Referring Physician: none    SUBJECTIVE ASSESSMENT:  72y Male seen and examined. Patient seen and examined. Has some mild pain to back of arm, otherwise no complaints. Tolerating PO diet, satting well on room air, ambulating with walker and assistance. Denies lightheadedness, headache, CP, palpitations, SOB, abdominal pain, nausea, vomiting, fever, chills.    HOSPITAL COURSE:  71 y/o male poor historian who lives in a nursing home with PMHx type B aortic dissection 2015, s/p TEVAR , HTN, asthma, BPH who presented to [facility] with new onset bruising left upper epigastric area, just under rib cage associated w/ pain to touch.  Came on about 5 days before.  Subsequent CTA chest showed minimal extravasation of contrast/endovascular leak, normal post-operative changes suggestive of reimplantation of great vessels. Patient was transferred to [facility] for evaluation with Dr. Marie.  Incidental UTI noted, Bactrim started causing YANIRA. Creatinine trending down now, continue gentle IVF. No surgical intervention needed at this time per Dr. Marie. Completed 7 days of IV abx and UTI course completed. RUE hematoma noted on RUE above biceps. Vanc started for possible abscess. General surgery consulted, no surgical intervention needed at this time, abx not indicated, recommend warm compresses. Labetalol decreased 2/2 borderline hypotension. Tolerating PO diet, ambulating with walker and assist, satting well on room air. Per Dr. Marie patient is medically stable for discharge back to living facility today, 5/3/22.    Vital Signs Last 24 Hrs  T(C): 36.8 (03 May 2022 14:02), Max: 37 (02 May 2022 18:26)  T(F): 98.2 (03 May 2022 14:02), Max: 98.6 (02 May 2022 18:26)  HR: 98 (03 May 2022 13:29) (82 - 104)  BP: 164/78 (03 May 2022 13:29) (90/58 - 164/78)  BP(mean): 112 (03 May 2022 13:29) (73 - 112)  RR: 20 (03 May 2022 13:29) (15 - 30)  SpO2: 99% (03 May 2022 13:29) (95% - 100%)    EPICARDIAL WIRES REMOVED: No.  TIE DOWNS REMOVED: No.    PHYSICAL EXAM:    General: NAD, sitting comfortably in chair, conversing appropriately  Neurological: alert and oriented, UE and LE strength equal b/l, facial symmetry present  Cardiovascular: RRR, Clear S1 and S2, no murmurs appreciated  Respiratory: chest expansion symmetrical, CTA b/l, no wheezing noted  Gastrointestinal: +BS, soft, NT, ND, ecchymosis to L anterior chest and LUQ to left flank, no erythema or surrounding warmth.   Extremities: moving spontaneously, no calf tenderness or edema. 1cm x1cm collection to posterior of R arm, above triceps. No erythema noted, warmth present. TTP  Vascular: warm, well perfused. DP/PT pulses palpable b/l.  Incisions: none    LABS:                        10.4   5.37  )-----------( 112      ( 03 May 2022 05:36 )             29.9       COUMADIN: no    PT/INR - ( 03 May 2022 05:36 )   PT: 13.6 sec;   INR: 1.14          PTT - ( 03 May 2022 05:36 )  PTT:31.6 sec    05-03    139  |  106  |  25<H>  ----------------------------<  91  4.6   |  23  |  1.38<H>    Ca    9.2      03 May 2022 05:36  Mg     1.7     05-03        Discharge ECHO:  < from: TTE Echo Complete w/o Contrast w/ Doppler (05.02.22 @ 11:22) >  CONCLUSIONS:     1. Normal left ventricular size and systolic function.   2. Normal right ventricular size and systolic function.   3. Moderate aortic regurgitation.   4. Aortic sclerosis without significant stenosis.   5. Mild-to-moderate mitral regurgitation.   6. Mild tricuspid regurgitation.   7. Pulmonary hypertension present, pulmonary artery systolic pressure is   37 mmHg.   8. No pericardial effusion.   9. Unable to compare to prior TTE given limited quality.    < end of copied text >

## 2022-05-03 NOTE — PROGRESS NOTE ADULT - ASSESSMENT
Mynor Marie (MD)  Surgery; Thoracic and Cardiac Surgery  [address] [place] [address]  Phone: [phone]  Fax: [phone]  Scheduled Appointment: 05/18/2022 12:00 PM  [facility]  CT Surg [address]  Scheduled Appointment: 05/18/2022

## 2022-05-03 NOTE — DISCHARGE NOTE NURSING/CASE MANAGEMENT/SOCIAL WORK - NSDCPEFALRISK_GEN_ALL_CORE
For information on Fall & Injury Prevention, visit: [email or web address] OR  [email or web address] OR  https://www.cdc.gov/steadi/patient.html

## 2022-05-03 NOTE — DISCHARGE NOTE NURSING/CASE MANAGEMENT/SOCIAL WORK - PATIENT PORTAL LINK FT
You can access the FollowMyHealth Patient Portal offered by [facility] by registering at the following website: [email or web address]. By joining [employer]’s FollowMyHealth portal, you will also be able to view your health information using other applications (apps) compatible with our system.

## 2022-05-10 DIAGNOSIS — I10 ESSENTIAL (PRIMARY) HYPERTENSION: ICD-10-CM
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
## 2022-05-10 DIAGNOSIS — B96.20 UNSPECIFIED ESCHERICHIA COLI [E. COLI] AS THE CAUSE OF DISEASES CLASSIFIED ELSEWHERE: ICD-10-CM

## 2022-05-10 DIAGNOSIS — J45.909 UNSPECIFIED ASTHMA, UNCOMPLICATED: ICD-10-CM

## 2022-05-10 DIAGNOSIS — N40.0 BENIGN PROSTATIC HYPERPLASIA WITHOUT LOWER URINARY TRACT SYMPTOMS: ICD-10-CM

## 2022-05-10 DIAGNOSIS — N39.0 URINARY TRACT INFECTION, SITE NOT SPECIFIED: ICD-10-CM
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
## 2022-05-10 DIAGNOSIS — L02.413 CUTANEOUS ABSCESS OF RIGHT UPPER LIMB: ICD-10-CM

## 2022-05-10 DIAGNOSIS — M79.81 NONTRAUMATIC HEMATOMA OF SOFT TISSUE: ICD-10-CM
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
## 2022-05-10 DIAGNOSIS — T82.330A LEAKAGE OF AORTIC (BIFURCATION) GRAFT (REPLACEMENT), INITIAL ENCOUNTER: ICD-10-CM

## 2022-05-10 DIAGNOSIS — N14.1 NEPHROPATHY INDUCED BY OTHER DRUGS, MEDICAMENTS AND BIOLOGICAL SUBSTANCES: ICD-10-CM

## 2022-05-10 DIAGNOSIS — I95.9 HYPOTENSION, UNSPECIFIED: ICD-10-CM

## 2022-05-10 DIAGNOSIS — T36.8X5A ADVERSE EFFECT OF OTHER SYSTEMIC ANTIBIOTICS, INITIAL ENCOUNTER: ICD-10-CM

## 2022-05-13 DIAGNOSIS — Z98.890 OTHER SPECIFIED POSTPROCEDURAL STATES: ICD-10-CM

## 2022-05-13 DIAGNOSIS — Z86.79 OTHER SPECIFIED POSTPROCEDURAL STATES: ICD-10-CM

## 2022-05-13 NOTE — DATA REVIEWED
[FreeTextEntry1] : Echo 5/1/22: \par  1. Normal left ventricular size and systolic function.\par  2. Normal right ventricular size and systolic function.\par  3. Moderate aortic regurgitation.\par  4. Aortic sclerosis without significant stenosis.\par  5. Mild-to-moderate mitral regurgitation.\par  6. Mild tricuspid regurgitation.\par  7. Pulmonary hypertension present, pulmonary artery systolic pressure is 37 mmHg.\par  8. No pericardial effusion.\par  9. Unable to compare to prior TTE given limited quality.\par \par \par CTA chest, abdomen and pelvis 4/27/22: \par 1. 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 Difficult\par to be certain what vessel it arises from as the there is artifact from\par endovascular coils but probably arises from a great vessel or branch in the\par right neck.\par 2. Other findings as described.\par

## 2022-05-13 NOTE — HISTORY OF PRESENT ILLNESS
[FreeTextEntry1] : 71 y/o male poor historian who lives in a nursing home with PMHx type B aortic dissection 2015, s/p TEVAR , HTN, asthma, BPH, who presents for a followup visit after recent hospitalization at [facility]. \par \par Pt initially presented to [facility] with new onset bruising left upper epigastric area, just under rib cage associated w/ pain to touch.  Came on about 5 days before.  Subsequent CTA chest 4/26/22 showed minimal extravasation of contrast/endovascular leak, normal post-operative changes suggestive of reimplantation of great vessels. Patient was transferred to [facility] for evaluation with Dr. Marie.  Incidental UTI noted, Bactrim started causing YANIRA. Creatinine trending down now, continue gentle IVF. No surgical intervention needed at this time per Dr. Marie. Completed 7 days of IV abx and UTI course completed. RUE hematoma noted on RUE above biceps. Vanc started for possible abscess. General surgery consulted, no surgical intervention needed at this time, abx not indicated, recommend warm compresses. Labetalol decreased 2/2 borderline hypotension. He was discharged to rehab on 5/3/22.\par \par Since discharge....

## 2022-05-18 ENCOUNTER — NON-APPOINTMENT (OUTPATIENT)
Age: 72
End: 2022-05-18

## 2022-05-18 ENCOUNTER — APPOINTMENT (OUTPATIENT)
Dept: CARDIOTHORACIC SURGERY | Facility: CLINIC | Age: 72
End: 2022-05-18
